# Patient Record
Sex: MALE | Race: OTHER | Employment: OTHER | URBAN - NONMETROPOLITAN AREA
[De-identification: names, ages, dates, MRNs, and addresses within clinical notes are randomized per-mention and may not be internally consistent; named-entity substitution may affect disease eponyms.]

---

## 2017-04-11 ENCOUNTER — OFFICE VISIT (OUTPATIENT)
Dept: PRIMARY CARE CLINIC | Age: 72
End: 2017-04-11
Payer: MEDICARE

## 2017-04-11 VITALS
HEART RATE: 74 BPM | BODY MASS INDEX: 30.15 KG/M2 | TEMPERATURE: 98.2 F | SYSTOLIC BLOOD PRESSURE: 130 MMHG | OXYGEN SATURATION: 94 % | WEIGHT: 215.4 LBS | HEIGHT: 71 IN | DIASTOLIC BLOOD PRESSURE: 74 MMHG

## 2017-04-11 DIAGNOSIS — J40 BRONCHITIS: Primary | ICD-10-CM

## 2017-04-11 PROCEDURE — 1123F ACP DISCUSS/DSCN MKR DOCD: CPT | Performed by: FAMILY MEDICINE

## 2017-04-11 PROCEDURE — 1036F TOBACCO NON-USER: CPT | Performed by: FAMILY MEDICINE

## 2017-04-11 PROCEDURE — 3017F COLORECTAL CA SCREEN DOC REV: CPT | Performed by: FAMILY MEDICINE

## 2017-04-11 PROCEDURE — G8417 CALC BMI ABV UP PARAM F/U: HCPCS | Performed by: FAMILY MEDICINE

## 2017-04-11 PROCEDURE — 4040F PNEUMOC VAC/ADMIN/RCVD: CPT | Performed by: FAMILY MEDICINE

## 2017-04-11 PROCEDURE — 99213 OFFICE O/P EST LOW 20 MIN: CPT | Performed by: FAMILY MEDICINE

## 2017-04-11 PROCEDURE — G8427 DOCREV CUR MEDS BY ELIG CLIN: HCPCS | Performed by: FAMILY MEDICINE

## 2017-04-11 RX ORDER — DEXTROMETHORPHAN HYDROBROMIDE AND PROMETHAZINE HYDROCHLORIDE 15; 6.25 MG/5ML; MG/5ML
5 SYRUP ORAL NIGHTLY PRN
Qty: 75 ML | Refills: 0 | Status: SHIPPED | OUTPATIENT
Start: 2017-04-11 | End: 2017-08-17 | Stop reason: ALTCHOICE

## 2017-04-11 RX ORDER — BENZONATATE 100 MG/1
100 CAPSULE ORAL 3 TIMES DAILY PRN
Qty: 30 CAPSULE | Refills: 0 | Status: SHIPPED | OUTPATIENT
Start: 2017-04-11 | End: 2017-08-17 | Stop reason: ALTCHOICE

## 2017-04-11 RX ORDER — PREDNISONE 20 MG/1
40 TABLET ORAL DAILY
Qty: 10 TABLET | Refills: 0 | Status: SHIPPED | OUTPATIENT
Start: 2017-04-11 | End: 2017-04-16

## 2017-04-11 ASSESSMENT — ENCOUNTER SYMPTOMS
SORE THROAT: 1
SHORTNESS OF BREATH: 0
WHEEZING: 0
DIARRHEA: 0
RHINORRHEA: 1
SINUS PRESSURE: 0
VOMITING: 0
NAUSEA: 0
COUGH: 1

## 2017-04-14 ENCOUNTER — TELEPHONE (OUTPATIENT)
Dept: PRIMARY CARE CLINIC | Age: 72
End: 2017-04-14

## 2017-04-14 DIAGNOSIS — J40 BRONCHITIS: Primary | ICD-10-CM

## 2017-04-14 RX ORDER — DOXYCYCLINE HYCLATE 100 MG/1
100 CAPSULE ORAL 2 TIMES DAILY
Qty: 20 CAPSULE | Refills: 0 | Status: SHIPPED | OUTPATIENT
Start: 2017-04-14 | End: 2017-04-24

## 2017-08-17 ENCOUNTER — OFFICE VISIT (OUTPATIENT)
Dept: PRIMARY CARE CLINIC | Age: 72
End: 2017-08-17
Payer: MEDICARE

## 2017-08-17 ENCOUNTER — HOSPITAL ENCOUNTER (OUTPATIENT)
Age: 72
Discharge: HOME | End: 2017-08-17
Payer: MEDICARE

## 2017-08-17 VITALS
HEART RATE: 92 BPM | RESPIRATION RATE: 18 BRPM | DIASTOLIC BLOOD PRESSURE: 50 MMHG | OXYGEN SATURATION: 92 % | TEMPERATURE: 100.2 F | SYSTOLIC BLOOD PRESSURE: 120 MMHG | BODY MASS INDEX: 28.36 KG/M2 | HEIGHT: 71 IN | WEIGHT: 202.6 LBS

## 2017-08-17 DIAGNOSIS — J06.9 UPPER RESPIRATORY TRACT INFECTION, UNSPECIFIED TYPE: Primary | ICD-10-CM

## 2017-08-17 DIAGNOSIS — M79.10 MYALGIA: ICD-10-CM

## 2017-08-17 LAB
ABSOLUTE EOS #: 0 K/UL (ref 0–0.4)
ABSOLUTE LYMPH #: 0.71 K/UL (ref 1–4.8)
ABSOLUTE MONO #: 0.71 K/UL (ref 0.1–1.2)
ANION GAP SERPL CALCULATED.3IONS-SCNC: 12 MMOL/L (ref 9–17)
BASOPHILS # BLD: 0 %
BASOPHILS ABSOLUTE: 0 K/UL (ref 0–0.2)
BUN BLDV-MCNC: 14 MG/DL (ref 8–23)
BUN/CREAT BLD: 11 (ref 9–20)
CALCIUM SERPL-MCNC: 9.2 MG/DL (ref 8.6–10.4)
CHLORIDE BLD-SCNC: 98 MMOL/L (ref 98–107)
CO2: 29 MMOL/L (ref 20–31)
CREAT SERPL-MCNC: 1.25 MG/DL (ref 0.7–1.2)
DIFFERENTIAL TYPE: ABNORMAL
EOSINOPHILS RELATIVE PERCENT: 0 %
GFR AFRICAN AMERICAN: >60 ML/MIN
GFR NON-AFRICAN AMERICAN: 57 ML/MIN
GFR SERPL CREATININE-BSD FRML MDRD: ABNORMAL ML/MIN/{1.73_M2}
GFR SERPL CREATININE-BSD FRML MDRD: ABNORMAL ML/MIN/{1.73_M2}
GLUCOSE BLD-MCNC: 124 MG/DL (ref 70–99)
HCT VFR BLD CALC: 44.7 % (ref 41–53)
HEMOGLOBIN: 14.7 G/DL (ref 13.5–17.5)
LYMPHOCYTES # BLD: 5 %
MCH RBC QN AUTO: 29.6 PG (ref 26–34)
MCHC RBC AUTO-ENTMCNC: 33 G/DL (ref 31–37)
MCV RBC AUTO: 89.8 FL (ref 80–100)
MONOCYTES # BLD: 5 %
MORPHOLOGY: ABNORMAL
PDW BLD-RTO: 14 % (ref 11–14.5)
PLATELET # BLD: 231 K/UL (ref 140–450)
PLATELET ESTIMATE: ABNORMAL
PMV BLD AUTO: 7.6 FL (ref 6–12)
POTASSIUM SERPL-SCNC: 4.1 MMOL/L (ref 3.7–5.3)
RBC # BLD: 4.98 M/UL (ref 4.5–5.9)
RBC # BLD: ABNORMAL 10*6/UL
SEDIMENTATION RATE, ERYTHROCYTE: 8 MM (ref 0–20)
SEG NEUTROPHILS: 90 %
SEGMENTED NEUTROPHILS ABSOLUTE COUNT: 12.78 K/UL (ref 1.8–7.7)
SODIUM BLD-SCNC: 139 MMOL/L (ref 135–144)
WBC # BLD: 14.2 K/UL (ref 3.5–11)
WBC # BLD: ABNORMAL 10*3/UL

## 2017-08-17 PROCEDURE — G8427 DOCREV CUR MEDS BY ELIG CLIN: HCPCS | Performed by: PHYSICIAN ASSISTANT

## 2017-08-17 PROCEDURE — 36415 COLL VENOUS BLD VENIPUNCTURE: CPT

## 2017-08-17 PROCEDURE — 1036F TOBACCO NON-USER: CPT | Performed by: PHYSICIAN ASSISTANT

## 2017-08-17 PROCEDURE — 4040F PNEUMOC VAC/ADMIN/RCVD: CPT | Performed by: PHYSICIAN ASSISTANT

## 2017-08-17 PROCEDURE — 1123F ACP DISCUSS/DSCN MKR DOCD: CPT | Performed by: PHYSICIAN ASSISTANT

## 2017-08-17 PROCEDURE — 3017F COLORECTAL CA SCREEN DOC REV: CPT | Performed by: PHYSICIAN ASSISTANT

## 2017-08-17 PROCEDURE — 80048 BASIC METABOLIC PNL TOTAL CA: CPT

## 2017-08-17 PROCEDURE — G8417 CALC BMI ABV UP PARAM F/U: HCPCS | Performed by: PHYSICIAN ASSISTANT

## 2017-08-17 PROCEDURE — 99213 OFFICE O/P EST LOW 20 MIN: CPT | Performed by: PHYSICIAN ASSISTANT

## 2017-08-17 PROCEDURE — 85651 RBC SED RATE NONAUTOMATED: CPT

## 2017-08-17 PROCEDURE — 85025 COMPLETE CBC W/AUTO DIFF WBC: CPT

## 2017-08-17 RX ORDER — DOXYCYCLINE HYCLATE 100 MG
100 TABLET ORAL 2 TIMES DAILY
Qty: 20 TABLET | Refills: 0 | Status: SHIPPED | OUTPATIENT
Start: 2017-08-17 | End: 2017-08-27

## 2017-08-17 RX ORDER — MONTELUKAST SODIUM 10 MG/1
10 TABLET ORAL NIGHTLY
COMMUNITY
Start: 2017-06-20

## 2017-08-17 RX ORDER — FAMOTIDINE 20 MG/1
20 TABLET, FILM COATED ORAL 2 TIMES DAILY
COMMUNITY
Start: 2017-05-24 | End: 2019-07-02

## 2017-08-17 RX ORDER — ATORVASTATIN CALCIUM 20 MG/1
20 TABLET, FILM COATED ORAL DAILY
COMMUNITY
Start: 2017-08-08

## 2017-08-17 RX ORDER — ALFUZOSIN HYDROCHLORIDE 10 MG/1
10 TABLET, EXTENDED RELEASE ORAL DAILY
COMMUNITY
Start: 2017-07-20

## 2017-08-17 RX ORDER — GABAPENTIN 300 MG/1
300 CAPSULE ORAL 3 TIMES DAILY
COMMUNITY
Start: 2017-05-24

## 2017-08-17 RX ORDER — HYDROCHLOROTHIAZIDE 25 MG/1
TABLET ORAL
COMMUNITY
Start: 2017-07-20

## 2017-08-17 ASSESSMENT — ENCOUNTER SYMPTOMS
COUGH: 0
DIARRHEA: 0
SORE THROAT: 0
ABDOMINAL PAIN: 0

## 2017-10-19 ENCOUNTER — OFFICE VISIT (OUTPATIENT)
Dept: PRIMARY CARE CLINIC | Age: 72
End: 2017-10-19
Payer: MEDICARE

## 2017-10-19 ENCOUNTER — HOSPITAL ENCOUNTER (OUTPATIENT)
Dept: CT IMAGING | Age: 72
Discharge: HOME | End: 2017-10-19
Payer: MEDICARE

## 2017-10-19 ENCOUNTER — HOSPITAL ENCOUNTER (OUTPATIENT)
Age: 72
Setting detail: SPECIMEN
Discharge: HOME | End: 2017-10-19
Payer: MEDICARE

## 2017-10-19 ENCOUNTER — HOSPITAL ENCOUNTER (OUTPATIENT)
Dept: GENERAL RADIOLOGY | Age: 72
End: 2017-10-19
Payer: MEDICARE

## 2017-10-19 VITALS
DIASTOLIC BLOOD PRESSURE: 62 MMHG | WEIGHT: 204.6 LBS | TEMPERATURE: 98.7 F | SYSTOLIC BLOOD PRESSURE: 140 MMHG | HEIGHT: 71 IN | BODY MASS INDEX: 28.64 KG/M2 | OXYGEN SATURATION: 96 % | RESPIRATION RATE: 12 BRPM | HEART RATE: 74 BPM

## 2017-10-19 DIAGNOSIS — R10.32 LLQ PAIN: Primary | ICD-10-CM

## 2017-10-19 DIAGNOSIS — R10.32 LLQ PAIN: ICD-10-CM

## 2017-10-19 DIAGNOSIS — K59.00 CONSTIPATION, UNSPECIFIED CONSTIPATION TYPE: ICD-10-CM

## 2017-10-19 DIAGNOSIS — R31.29 OTHER MICROSCOPIC HEMATURIA: ICD-10-CM

## 2017-10-19 DIAGNOSIS — K57.32 DIVERTICULITIS OF LARGE INTESTINE WITHOUT PERFORATION OR ABSCESS WITHOUT BLEEDING: ICD-10-CM

## 2017-10-19 LAB
-: ABNORMAL
ABSOLUTE EOS #: 0.3 K/UL (ref 0–0.4)
ABSOLUTE IMMATURE GRANULOCYTE: ABNORMAL K/UL (ref 0–0.3)
ABSOLUTE LYMPH #: 2.5 K/UL (ref 1–4.8)
ABSOLUTE MONO #: 0.9 K/UL (ref 0.1–1.2)
AMORPHOUS: ABNORMAL
BACTERIA: ABNORMAL
BASOPHILS # BLD: 0 % (ref 0–2)
BASOPHILS ABSOLUTE: 0.1 K/UL (ref 0–0.2)
BILIRUBIN URINE: NEGATIVE
CASTS UA: ABNORMAL /LPF (ref 0–2)
COLOR: ABNORMAL
COMMENT UA: ABNORMAL
CRYSTALS, UA: ABNORMAL /HPF
DIFFERENTIAL TYPE: ABNORMAL
EOSINOPHILS RELATIVE PERCENT: 2 % (ref 1–8)
EPITHELIAL CELLS UA: ABNORMAL /HPF (ref 0–5)
GLUCOSE URINE: NEGATIVE
HCT VFR BLD CALC: 42.6 % (ref 41–53)
HEMOGLOBIN: 14.4 G/DL (ref 13.5–17.5)
IMMATURE GRANULOCYTES: ABNORMAL %
KETONES, URINE: NEGATIVE
LEUKOCYTE ESTERASE, URINE: NEGATIVE
LYMPHOCYTES # BLD: 15 % (ref 15–43)
MCH RBC QN AUTO: 30.4 PG (ref 26–34)
MCHC RBC AUTO-ENTMCNC: 33.8 G/DL (ref 31–37)
MCV RBC AUTO: 90 FL (ref 80–100)
MONOCYTES # BLD: 6 % (ref 6–14)
MUCUS: ABNORMAL
NITRITE, URINE: NEGATIVE
OTHER OBSERVATIONS UA: ABNORMAL
PDW BLD-RTO: 14.9 % (ref 11–14.5)
PH UA: 5.5 (ref 5–6)
PLATELET # BLD: 271 K/UL (ref 140–450)
PLATELET ESTIMATE: ABNORMAL
PMV BLD AUTO: 7.8 FL (ref 6–12)
PROTEIN UA: NEGATIVE
RBC # BLD: 4.73 M/UL (ref 4.5–5.9)
RBC # BLD: ABNORMAL 10*6/UL
RBC UA: ABNORMAL /HPF (ref 0–4)
RENAL EPITHELIAL, UA: ABNORMAL /HPF
SEG NEUTROPHILS: 77 % (ref 44–74)
SEGMENTED NEUTROPHILS ABSOLUTE COUNT: 12.3 K/UL (ref 1.8–7.7)
SPECIFIC GRAVITY UA: 1.02 (ref 1.01–1.02)
TRICHOMONAS: ABNORMAL
TURBIDITY: ABNORMAL
URINE HGB: ABNORMAL
UROBILINOGEN, URINE: NORMAL
WBC # BLD: 16.1 K/UL (ref 3.5–11)
WBC # BLD: ABNORMAL 10*3/UL
WBC UA: ABNORMAL /HPF (ref 0–4)
YEAST: ABNORMAL

## 2017-10-19 PROCEDURE — 4040F PNEUMOC VAC/ADMIN/RCVD: CPT | Performed by: PHYSICIAN ASSISTANT

## 2017-10-19 PROCEDURE — 74176 CT ABD & PELVIS W/O CONTRAST: CPT

## 2017-10-19 PROCEDURE — 81001 URINALYSIS AUTO W/SCOPE: CPT

## 2017-10-19 PROCEDURE — G8417 CALC BMI ABV UP PARAM F/U: HCPCS | Performed by: PHYSICIAN ASSISTANT

## 2017-10-19 PROCEDURE — 99215 OFFICE O/P EST HI 40 MIN: CPT | Performed by: PHYSICIAN ASSISTANT

## 2017-10-19 PROCEDURE — 3017F COLORECTAL CA SCREEN DOC REV: CPT | Performed by: PHYSICIAN ASSISTANT

## 2017-10-19 PROCEDURE — 85025 COMPLETE CBC W/AUTO DIFF WBC: CPT

## 2017-10-19 PROCEDURE — G8484 FLU IMMUNIZE NO ADMIN: HCPCS | Performed by: PHYSICIAN ASSISTANT

## 2017-10-19 PROCEDURE — 36415 COLL VENOUS BLD VENIPUNCTURE: CPT

## 2017-10-19 PROCEDURE — 1036F TOBACCO NON-USER: CPT | Performed by: PHYSICIAN ASSISTANT

## 2017-10-19 PROCEDURE — 1123F ACP DISCUSS/DSCN MKR DOCD: CPT | Performed by: PHYSICIAN ASSISTANT

## 2017-10-19 PROCEDURE — G8427 DOCREV CUR MEDS BY ELIG CLIN: HCPCS | Performed by: PHYSICIAN ASSISTANT

## 2017-10-19 RX ORDER — CIPROFLOXACIN 500 MG/1
500 TABLET, FILM COATED ORAL 2 TIMES DAILY
Qty: 20 TABLET | Refills: 0 | Status: SHIPPED | OUTPATIENT
Start: 2017-10-19 | End: 2017-10-29

## 2017-10-19 RX ORDER — OMEPRAZOLE 20 MG/1
20 CAPSULE, DELAYED RELEASE ORAL DAILY
COMMUNITY
Start: 2017-08-25

## 2017-10-19 RX ORDER — METRONIDAZOLE 500 MG/1
500 TABLET ORAL 3 TIMES DAILY
Qty: 30 TABLET | Refills: 0 | Status: SHIPPED | OUTPATIENT
Start: 2017-10-19 | End: 2017-10-29

## 2017-10-19 ASSESSMENT — ENCOUNTER SYMPTOMS
VOMITING: 0
BLOOD IN STOOL: 0
ABDOMINAL DISTENTION: 1
CONSTIPATION: 1
RESPIRATORY NEGATIVE: 1
NAUSEA: 0
ABDOMINAL PAIN: 1
DIARRHEA: 0

## 2017-10-19 NOTE — PROGRESS NOTES
DIFFTYPE NOT REPORTED 10/19/2017     U/A:    Lab Results   Component Value Date    COLORU NOT REPORTED 10/19/2017    PROTEINU NEGATIVE 10/19/2017    PHUR 5.5 10/19/2017    WBCUA None 10/19/2017    RBCUA 5 TO 10 10/19/2017    MUCUS 1+ 10/19/2017    TRICHOMONAS NOT REPORTED 10/19/2017    YEAST NOT REPORTED 10/19/2017    BACTERIA None 10/19/2017    SPECGRAV 1.020 10/19/2017    LEUKOCYTESUR NEGATIVE 10/19/2017    UROBILINOGEN Normal 10/19/2017    BILIRUBINUR NEGATIVE 10/19/2017    GLUCOSEU NEGATIVE 10/19/2017    AMORPHOUS NOT REPORTED 10/19/2017     CT abdomen and pelvis was consistent with diverticulitis. Assessment:      1. LLQ pain  UA W/REFLEX CULTURE    CBC Auto Differential    CT ABDOMEN PELVIS WO IV CONTRAST Additional Contrast? None    CANCELED: XR ABDOMEN (complete, including decubitus and/or erect views)   2. Constipation, unspecified constipation type  CBC Auto Differential    CT ABDOMEN PELVIS WO IV CONTRAST Additional Contrast? None    CANCELED: XR ABDOMEN (complete, including decubitus and/or erect views)   3. Other microscopic hematuria  CT ABDOMEN PELVIS WO IV CONTRAST Additional Contrast? None   4.  Diverticulitis of large intestine without perforation or abscess without bleeding  ciprofloxacin (CIPRO) 500 MG tablet    metroNIDAZOLE (FLAGYL) 500 MG tablet           Plan:      Patient was notified of all results  Discussed diverticular disease diverticulosis and diverticulitis with patient and his wife  Answered his questions  Recommend soft diet for the next few days  But then can gradually increase  Recommend Metamucil at least once a day  Any problems with the Cipro Flagyl let me know  Follow-up with PCP this week sooner if problems  Should follow-up with surgery in 2 weeks and discuss the need for colonoscopy  Fluids rest

## 2017-10-19 NOTE — PATIENT INSTRUCTIONS
Patient Education        Constipation: Care Instructions  Your Care Instructions  Constipation means that you have a hard time passing stools (bowel movements). People pass stools from 3 times a day to once every 3 days. What is normal for you may be different. Constipation may occur with pain in the rectum and cramping. The pain may get worse when you try to pass stools. Sometimes there are small amounts of bright red blood on toilet paper or the surface of stools. This is because of enlarged veins near the rectum (hemorrhoids). A few changes in your diet and lifestyle may help you avoid ongoing constipation. Your doctor may also prescribe medicine to help loosen your stool. Some medicines can cause constipation. These include pain medicines and antidepressants. Tell your doctor about all the medicines you take. Your doctor may want to make a medicine change to ease your symptoms. Follow-up care is a key part of your treatment and safety. Be sure to make and go to all appointments, and call your doctor if you are having problems. It's also a good idea to know your test results and keep a list of the medicines you take. How can you care for yourself at home? · Drink plenty of fluids, enough so that your urine is light yellow or clear like water. If you have kidney, heart, or liver disease and have to limit fluids, talk with your doctor before you increase the amount of fluids you drink. · Include high-fiber foods in your diet each day. These include fruits, vegetables, beans, and whole grains. · Get at least 30 minutes of exercise on most days of the week. Walking is a good choice. You also may want to do other activities, such as running, swimming, cycling, or playing tennis or team sports. · Take a fiber supplement, such as Citrucel or Metamucil, every day. Read and follow all instructions on the label. · Schedule time each day for a bowel movement. A daily routine may help.  Take your time having your bowel movement. · Support your feet with a small step stool when you sit on the toilet. This helps flex your hips and places your pelvis in a squatting position. · Your doctor may recommend an over-the-counter laxative to relieve your constipation. Examples are Milk of Magnesia and MiraLax. Read and follow all instructions on the label. Do not use laxatives on a long-term basis. When should you call for help? Call your doctor now or seek immediate medical care if:  · You have new or worse belly pain. · You have new or worse nausea or vomiting. · You have blood in your stools. Watch closely for changes in your health, and be sure to contact your doctor if:  · Your constipation is getting worse. · You do not get better as expected. Where can you learn more? Go to https://Mandelbrot Projectrenitaeb.Anygma. org and sign in to your NYCareerElite account. Enter [phone] in the [employer] box to learn more about \"Constipation: Care Instructions. \"     If you do not have an account, please click on the \"Sign Up Now\" link. Current as of: March 20, 2017  Content Version: 11.3  © 5286-0246 ExpertBeacon. Care instructions adapted under license by [facility] ([facility]). If you have questions about a medical condition or this instruction, always ask your healthcare professional. [address] any warranty or liability for your use of this information. Patient Education        Diverticulitis: Care Instructions  Your Care Instructions    Diverticulitis occurs when pouches form in the wall of the colon and become inflamed or infected. It can be very painful. Doctors aren't sure what causes diverticulitis. There is no proof that foods such as nuts, seeds, or berries cause it or make it worse. A low-fiber diet may cause the colon to work harder to push stool forward. Pouches may form because of this extra work. It may be hard to think about healthy eating while you're in pain.  But as you the week. ¨ Take a fiber supplement, such as Citrucel or Metamucil, every day if needed. Read and follow all instructions on the label. ¨ Schedule time each day for a bowel movement. Having a daily routine may help. Take your time and do not strain when having a bowel movement. When should you call for help? Call 911 anytime you think you may need emergency care. For example, call if:  · You passed out (lost consciousness). · You vomit blood or what looks like coffee grounds. · You pass maroon or very bloody stools. Call your doctor now or seek immediate medical care if:  · You have severe pain or swelling in your belly. · You have a new or higher fever. · You cannot keep down fluids or medicines. · You have new pain that gets worse when you move or cough. Watch closely for changes in your health, and be sure to contact your doctor if:  · The symptoms you had when you first started feeling sick come back. · You do not get better as expected. Where can you learn more? Go to https://Happy Kidz.CiDRA. org and sign in to your Exaprotect account. Enter H901 in the K[employer]ian box to learn more about \"Diverticulitis: Care Instructions. \"     If you do not have an account, please click on the \"Sign Up Now\" link. Current as of: August 9, 2016  Content Version: 11.3  © 3685-0874 [employer]. Care instructions adapted under license by [facility] ([facility]). If you have questions about a medical condition or this instruction, always ask your healthcare professional. [place] [address] any warranty or liability for your use of this information.

## 2017-12-20 ENCOUNTER — OFFICE VISIT (OUTPATIENT)
Dept: PRIMARY CARE CLINIC | Age: 72
End: 2017-12-20
Payer: MEDICARE

## 2017-12-20 VITALS
HEART RATE: 78 BPM | RESPIRATION RATE: 16 BRPM | BODY MASS INDEX: 28.36 KG/M2 | DIASTOLIC BLOOD PRESSURE: 78 MMHG | WEIGHT: 202.6 LBS | HEIGHT: 71 IN | SYSTOLIC BLOOD PRESSURE: 130 MMHG | OXYGEN SATURATION: 94 % | TEMPERATURE: 98.5 F

## 2017-12-20 DIAGNOSIS — J40 BRONCHITIS: Primary | ICD-10-CM

## 2017-12-20 PROCEDURE — 1123F ACP DISCUSS/DSCN MKR DOCD: CPT | Performed by: NURSE PRACTITIONER

## 2017-12-20 PROCEDURE — 4040F PNEUMOC VAC/ADMIN/RCVD: CPT | Performed by: NURSE PRACTITIONER

## 2017-12-20 PROCEDURE — 3017F COLORECTAL CA SCREEN DOC REV: CPT | Performed by: NURSE PRACTITIONER

## 2017-12-20 PROCEDURE — 1036F TOBACCO NON-USER: CPT | Performed by: NURSE PRACTITIONER

## 2017-12-20 PROCEDURE — G8427 DOCREV CUR MEDS BY ELIG CLIN: HCPCS | Performed by: NURSE PRACTITIONER

## 2017-12-20 PROCEDURE — G8417 CALC BMI ABV UP PARAM F/U: HCPCS | Performed by: NURSE PRACTITIONER

## 2017-12-20 PROCEDURE — 99202 OFFICE O/P NEW SF 15 MIN: CPT | Performed by: NURSE PRACTITIONER

## 2017-12-20 PROCEDURE — G8484 FLU IMMUNIZE NO ADMIN: HCPCS | Performed by: NURSE PRACTITIONER

## 2017-12-20 RX ORDER — AZITHROMYCIN 250 MG/1
TABLET, FILM COATED ORAL
Qty: 1 PACKET | Refills: 0 | Status: SHIPPED | OUTPATIENT
Start: 2017-12-20 | End: 2017-12-30

## 2017-12-20 ASSESSMENT — ENCOUNTER SYMPTOMS
RHINORRHEA: 1
SINUS PAIN: 1
SORE THROAT: 1
COUGH: 1

## 2017-12-20 NOTE — PROGRESS NOTES
Subjective:      Patient ID: Riana Gallego is a 67 y.o. male. URI    This is a new problem. The current episode started in the past 7 days. The problem has been gradually worsening. Associated symptoms include congestion, coughing, headaches, rhinorrhea, sinus pain and a sore throat. Treatments tried: Sudafed. The treatment provided mild relief. Sinusitis   Associated symptoms include congestion, coughing, headaches and a sore throat. Review of Systems   HENT: Positive for congestion, postnasal drip, rhinorrhea, sinus pain and sore throat. Respiratory: Positive for cough. Cardiovascular: Negative. Musculoskeletal: Negative. Skin: Negative. Neurological: Positive for headaches. Objective:   Physical Exam   Constitutional: He appears well-developed and well-nourished. HENT:   Head: Normocephalic and atraumatic. Right Ear: Tympanic membrane, external ear and ear canal normal.   Left Ear: Tympanic membrane, external ear and ear canal normal.   Nose: Rhinorrhea present. Mouth/Throat: Uvula is midline, oropharynx is clear and moist and mucous membranes are normal.   Eyes: Conjunctivae, EOM and lids are normal. Pupils are equal, round, and reactive to light. Neck: Trachea normal and normal range of motion. Neck supple. Cardiovascular: Normal rate, regular rhythm, normal heart sounds and normal pulses. Pulmonary/Chest: Effort normal. He has decreased breath sounds in the right upper field, the right middle field and the left upper field. Abdominal: Soft. Bowel sounds are normal.   Musculoskeletal: Normal range of motion. Skin: Skin is warm, dry and intact. Vitals reviewed. Vitals:    12/20/17 1036   BP: 130/78   Pulse: 78   Resp: 16   Temp: 98.5 °F (36.9 °C)   SpO2: 94%     I have reviewed pertinent family and social history. Assessment:      1. Bronchitis  azithromycin (ZITHROMAX) 250 MG tablet           Plan:      Stay hydrated and drink plenty of fluids.     May use cough suppressant prn comfort as directed. Encouraged patient to complete full course of antibiotic and to return to clinic if no improvement in 3-4 days. May use OTC acetaminophen or ibuprofen prn pain/fever. Recommend Mucinex, Neti Pot. Recommend OTC Flonase and/or Antihistamine daily. Allergies   Allergen Reactions    Asa [Aspirin] Rash     Current Outpatient Prescriptions   Medication Sig Dispense Refill    azithromycin (ZITHROMAX) 250 MG tablet Take 2 tabs (500 mg) on Day 1, and take 1 tab (250 mg) on days 2 through 5. 1 packet 0    omeprazole (PRILOSEC) 20 MG delayed release capsule Take 20 mg by mouth daily      atorvastatin (LIPITOR) 20 MG tablet Take 20 mg by mouth daily       hydrochlorothiazide (HYDRODIURIL) 25 MG tablet       metFORMIN (GLUCOPHAGE) 500 MG tablet Take 500 mg by mouth daily (with breakfast)       alfuzosin (UROXATRAL) 10 MG extended release tablet Take 10 mg by mouth daily       famotidine (PEPCID) 20 MG tablet Take 20 mg by mouth 2 times daily       gabapentin (NEURONTIN) 300 MG capsule Take 300 mg by mouth 3 times daily       montelukast (SINGULAIR) 10 MG tablet Take 10 mg by mouth nightly        No current facility-administered medications for this visit.

## 2017-12-20 NOTE — PATIENT INSTRUCTIONS
good.  When should you call for help? Call 911 anytime you think you may need emergency care. For example, call if:  ? · You have severe trouble breathing. ?Call your doctor now or seek immediate medical care if:  ? · You have new or worse trouble breathing. ? · You cough up dark brown or bloody mucus (sputum). ? · You have a new or higher fever. ? · You have a new rash. ? Watch closely for changes in your health, and be sure to contact your doctor if:  ? · You cough more deeply or more often, especially if you notice more mucus or a change in the color of your mucus. ? · You are not getting better as expected. Where can you learn more? Go to https://Aunt Bertha.LiveAction. org and sign in to your startuply account. Enter H333 in the "astamuse company, ltd." box to learn more about \"Bronchitis: Care Instructions. \"     If you do not have an account, please click on the \"Sign Up Now\" link. Current as of: May 12, 2017  Content Version: 11.4  © 1045-7597 Healthwise, Incorporated. Care instructions adapted under license by [facility] ([facility]). If you have questions about a medical condition or this instruction, always ask your healthcare professional. [address] any warranty or liability for your use of this information.

## 2018-10-16 ENCOUNTER — TELEPHONE (OUTPATIENT)
Dept: FAMILY MEDICINE CLINIC | Age: 73
End: 2018-10-16

## 2018-12-14 ENCOUNTER — HOSPITAL ENCOUNTER (OUTPATIENT)
Dept: GENERAL RADIOLOGY | Age: 73
Discharge: HOME | End: 2018-12-16
Payer: MEDICARE

## 2018-12-14 ENCOUNTER — OFFICE VISIT (OUTPATIENT)
Dept: PRIMARY CARE CLINIC | Age: 73
End: 2018-12-14
Payer: MEDICARE

## 2018-12-14 VITALS
DIASTOLIC BLOOD PRESSURE: 80 MMHG | HEART RATE: 74 BPM | BODY MASS INDEX: 28.29 KG/M2 | SYSTOLIC BLOOD PRESSURE: 138 MMHG | WEIGHT: 200 LBS | TEMPERATURE: 98 F | OXYGEN SATURATION: 98 %

## 2018-12-14 DIAGNOSIS — M79.671 PAIN OF RIGHT HEEL: ICD-10-CM

## 2018-12-14 DIAGNOSIS — M77.31 HEEL SPUR, RIGHT: Primary | ICD-10-CM

## 2018-12-14 PROCEDURE — 1123F ACP DISCUSS/DSCN MKR DOCD: CPT | Performed by: FAMILY MEDICINE

## 2018-12-14 PROCEDURE — 3017F COLORECTAL CA SCREEN DOC REV: CPT | Performed by: FAMILY MEDICINE

## 2018-12-14 PROCEDURE — 73630 X-RAY EXAM OF FOOT: CPT

## 2018-12-14 PROCEDURE — 4040F PNEUMOC VAC/ADMIN/RCVD: CPT | Performed by: FAMILY MEDICINE

## 2018-12-14 PROCEDURE — 1036F TOBACCO NON-USER: CPT | Performed by: FAMILY MEDICINE

## 2018-12-14 PROCEDURE — G8484 FLU IMMUNIZE NO ADMIN: HCPCS | Performed by: FAMILY MEDICINE

## 2018-12-14 PROCEDURE — 99214 OFFICE O/P EST MOD 30 MIN: CPT | Performed by: FAMILY MEDICINE

## 2018-12-14 PROCEDURE — G8427 DOCREV CUR MEDS BY ELIG CLIN: HCPCS | Performed by: FAMILY MEDICINE

## 2018-12-14 PROCEDURE — G8417 CALC BMI ABV UP PARAM F/U: HCPCS | Performed by: FAMILY MEDICINE

## 2018-12-14 PROCEDURE — 1101F PT FALLS ASSESS-DOCD LE1/YR: CPT | Performed by: FAMILY MEDICINE

## 2018-12-14 RX ORDER — PREDNISONE 20 MG/1
TABLET ORAL
Qty: 15 TABLET | Refills: 0 | Status: SHIPPED | OUTPATIENT
Start: 2018-12-14 | End: 2019-04-03 | Stop reason: ALTCHOICE

## 2018-12-14 ASSESSMENT — ENCOUNTER SYMPTOMS
EYES NEGATIVE: 1
GASTROINTESTINAL NEGATIVE: 1
RESPIRATORY NEGATIVE: 1

## 2018-12-14 NOTE — PATIENT INSTRUCTIONS
Patient Education        Plantar Fasciitis: Exercises  Your Care Instructions  Here are some examples of typical rehabilitation exercises for your condition. Start each exercise slowly. Ease off the exercise if you start to have pain. Your doctor or physical therapist will tell you when you can start these exercises and which ones will work best for you. How to do the exercises  Towel stretch    1. Sit with your legs extended and knees straight. 2. Place a towel around your foot just under the toes. 3. Hold each end of the towel in each hand, with your hands above your knees. 4. Pull back with the towel so that your foot stretches toward you. 5. Hold the position for at least 15 to 30 seconds. 6. Repeat 2 to 4 times a session, up to 5 sessions a day. Calf stretch    1. Stand facing a wall with your hands on the wall at about eye level. Put the leg you want to stretch about a step behind your other leg. 2. Keeping your back heel on the floor, bend your front knee until you feel a stretch in the back leg. 3. Hold the stretch for 15 to 30 seconds. Repeat 2 to 4 times. Plantar fascia and calf stretch    1. Stand on a step as shown above. Be sure to hold on to the banister. 2. Slowly let your heels down over the edge of the step as you relax your calf muscles. You should feel a gentle stretch across the bottom of your foot and up the back of your leg to your knee. 3. Hold the stretch about 15 to 30 seconds, and then tighten your calf muscle a little to bring your heel back up to the level of the step. Repeat 2 to 4 times. Towel curls    1. While sitting, place your foot on a towel on the floor and scrunch the towel toward you with your toes. 2. Then, also using your toes, push the towel away from you. [place] pickups    1. Put marbles on the floor next to a cup.  2. Using your toes, try to lift the marbles up from the floor and put them in the cup.     Follow-up care is a key part of your often. Reduce your activity to a level that lets you avoid pain. If possible, do not run or walk on hard surfaces. · Take pain medicines exactly as directed. ? If the doctor gave you a prescription medicine for pain, take it as prescribed. ? If you are not taking a prescription pain medicine, take an over-the-counter anti-inflammatory medicine for pain and swelling, such as ibuprofen (Advil, Motrin) or naproxen (Aleve). Read and follow all instructions on the label. · Use ice massage to help with pain and swelling. You can use an ice cube or an ice cup several times a day. To make an ice cup, fill a paper cup with water and freeze it. Cut off the top of the cup until a half-inch of ice shows. Hold onto the remaining paper to use the cup. Rub the ice in small circles over the area for 5 to 7 minutes. · Contrast baths, which alternate hot and cold water, can also help reduce swelling. But because heat alone may make pain and swelling worse, end a contrast bath with a soak in cold water. · Wear a night splint if your doctor suggests it. A night splint holds your foot with the toes pointed up and the foot and ankle at a 90-degree angle. This position gives the bottom of your foot a constant, gentle stretch. · Do simple exercises such as calf stretches and towel stretches 2 to 3 times each day, especially when you first get up in the morning. These can help the plantar fascia become more flexible. They also make the muscles that support your arch stronger. Hold these stretches for 15 to 30 seconds per stretch. Repeat 2 to 4 times. ? Stand about 1 foot from a wall. Place the palms of both hands against the wall at chest level. Lean forward against the wall, keeping one leg with the knee straight and heel on the ground while bending the knee of the other leg.  ? Sit down on the floor or a mat with your feet stretched in front of you. Roll up a towel lengthwise, and loop it over the ball of your foot.  Holding the towel at both ends, gently pull the towel toward you to stretch your foot. · Wear shoes with good arch support. Athletic shoes or shoes with a well-cushioned sole are good choices. · Try heel cups or shoe inserts (orthotics) to help cushion your heel. You can buy these at many shoe stores. · Put on your shoes as soon as you get out of bed. Going barefoot or wearing slippers may make your pain worse. · Reach and stay at a good weight for your height. This puts less strain on your feet. When should you call for help? Call your doctor now or seek immediate medical care if:    · You have heel pain with fever, redness, or warmth in your heel.     · You cannot put weight on the sore foot.    Watch closely for changes in your health, and be sure to contact your doctor if:    · You have numbness or tingling in your heel.     · Your heel pain lasts more than 2 weeks. Where can you learn more? Go to https://ipsy.Arrowhead Automated Systems. org and sign in to your Skilljar account. Enter F394 in the [employer] box to learn more about \"Plantar Fasciitis: Care Instructions. \"     If you do not have an account, please click on the \"Sign Up Now\" link. Current as of: November 29, 2017  Content Version: 11.8  © 3109-1135 Healthwise, Incorporated. Care instructions adapted under license by [facility] ([facility]). If you have questions about a medical condition or this instruction, always ask your healthcare professional. [place] [address] any warranty or liability for your use of this information.

## 2019-01-10 ENCOUNTER — OFFICE VISIT (OUTPATIENT)
Dept: PODIATRY | Age: 74
End: 2019-01-10
Payer: MEDICARE

## 2019-01-10 VITALS
SYSTOLIC BLOOD PRESSURE: 138 MMHG | HEART RATE: 72 BPM | HEIGHT: 71 IN | WEIGHT: 202 LBS | DIASTOLIC BLOOD PRESSURE: 78 MMHG | BODY MASS INDEX: 28.28 KG/M2

## 2019-01-10 DIAGNOSIS — M72.2 PLANTAR FASCIITIS OF RIGHT FOOT: Primary | ICD-10-CM

## 2019-01-10 DIAGNOSIS — B35.1 DERMATOPHYTOSIS OF NAIL: ICD-10-CM

## 2019-01-10 PROCEDURE — L3040 FT ARCH SUPRT PREMOLD LONGIT: HCPCS | Performed by: PODIATRIST

## 2019-01-10 PROCEDURE — G8427 DOCREV CUR MEDS BY ELIG CLIN: HCPCS | Performed by: PODIATRIST

## 2019-01-10 PROCEDURE — 1036F TOBACCO NON-USER: CPT | Performed by: PODIATRIST

## 2019-01-10 PROCEDURE — 4040F PNEUMOC VAC/ADMIN/RCVD: CPT | Performed by: PODIATRIST

## 2019-01-10 PROCEDURE — 99203 OFFICE O/P NEW LOW 30 MIN: CPT | Performed by: PODIATRIST

## 2019-01-10 PROCEDURE — 1123F ACP DISCUSS/DSCN MKR DOCD: CPT | Performed by: PODIATRIST

## 2019-01-10 PROCEDURE — G8417 CALC BMI ABV UP PARAM F/U: HCPCS | Performed by: PODIATRIST

## 2019-01-10 PROCEDURE — 1101F PT FALLS ASSESS-DOCD LE1/YR: CPT | Performed by: PODIATRIST

## 2019-01-10 PROCEDURE — G8484 FLU IMMUNIZE NO ADMIN: HCPCS | Performed by: PODIATRIST

## 2019-01-10 PROCEDURE — 3017F COLORECTAL CA SCREEN DOC REV: CPT | Performed by: PODIATRIST

## 2019-02-12 ENCOUNTER — TELEPHONE (OUTPATIENT)
Dept: PODIATRY | Age: 74
End: 2019-02-12

## 2019-04-03 ENCOUNTER — OFFICE VISIT (OUTPATIENT)
Dept: PRIMARY CARE CLINIC | Age: 74
End: 2019-04-03
Payer: MEDICARE

## 2019-04-03 VITALS
DIASTOLIC BLOOD PRESSURE: 68 MMHG | OXYGEN SATURATION: 97 % | HEIGHT: 70 IN | BODY MASS INDEX: 30.24 KG/M2 | TEMPERATURE: 98.3 F | RESPIRATION RATE: 18 BRPM | HEART RATE: 88 BPM | SYSTOLIC BLOOD PRESSURE: 140 MMHG | WEIGHT: 211.2 LBS

## 2019-04-03 DIAGNOSIS — H11.31 BURST BLOOD VESSEL OF RIGHT EYE: ICD-10-CM

## 2019-04-03 DIAGNOSIS — H10.31 ACUTE CONJUNCTIVITIS OF RIGHT EYE, UNSPECIFIED ACUTE CONJUNCTIVITIS TYPE: Primary | ICD-10-CM

## 2019-04-03 PROCEDURE — 99214 OFFICE O/P EST MOD 30 MIN: CPT | Performed by: NURSE PRACTITIONER

## 2019-04-03 RX ORDER — TOBRAMYCIN 3 MG/ML
1 SOLUTION/ DROPS OPHTHALMIC EVERY 4 HOURS
Qty: 1 BOTTLE | Refills: 0 | Status: SHIPPED | OUTPATIENT
Start: 2019-04-03 | End: 2019-04-09

## 2019-04-03 NOTE — PROGRESS NOTES
[facility]  [address] [facility] [place] [address]   Phone: [phone]  Fax: [phone]    Date: 4/3/2019   Patient:  Emily Gonzalez   YOB: 1945 Age: 75 y.o. MRN: [id number]   PCP: Christy Reeder MD       Subjective:    Chief Complaint   Patient presents with    Eye Problem     right eye red, eye drainage, sx started this morning       HPI: Patient presents with complaints of right eye irritation and drainage for the past 1 day. He has tried otc moisturizing eye drops without relief. He states he wipes his eyes out daily with a hankerchief and rubbed the eye this morning when he noticed it was red. He states it has been tearing excessively. He denies pain or itching. He denies purulent drainage. He does not wear contacts. He denies any change in his vision. He denies any foreign body sensation. History is obtained from the patient and previous medical records. All other review of systems negative. Allergies   Allergen Reactions    Asa [Aspirin] Rash       Current Outpatient Medications   Medication Sig Dispense Refill    tobramycin (TOBREX) 0.3 % ophthalmic solution Place 1 drop into the right eye every 4 hours for 6 days 1 Bottle 0    Elastic Bandages & Supports (ANKLE SPLINT/NIGHT AIRFORM) MISC 1 Device by Does not apply route every evening Plantar fasciitis of right foot  (primary encounter diagnosis)      Dispense posterior night splint. 1 each 0    ciclopirox (PENLAC) 8 % solution Apply topically nightly.  1 Bottle 3    omeprazole (PRILOSEC) 20 MG delayed release capsule Take 20 mg by mouth daily      atorvastatin (LIPITOR) 20 MG tablet Take 20 mg by mouth daily       hydrochlorothiazide (HYDRODIURIL) 25 MG tablet       metFORMIN (GLUCOPHAGE) 500 MG tablet Take 500 mg by mouth daily (with breakfast)       alfuzosin (UROXATRAL) 10 MG extended release tablet Take 10 mg by mouth daily       famotidine (PEPCID) 20 MG tablet Take 20 mg by mouth 2 times daily  gabapentin (NEURONTIN) 300 MG capsule Take 300 mg by mouth 3 times daily       montelukast (SINGULAIR) 10 MG tablet Take 10 mg by mouth nightly        No current facility-administered medications for this visit. Past Medical History:   Diagnosis Date    Asthma     Hyperlipidemia     Hypertension        Social History     Tobacco Use    Smoking status: Never Smoker    Smokeless tobacco: Never Used   Substance Use Topics    Alcohol use: No    Drug use: No       Significant family and surgical history reviewed as noted in the patient's record. Objective:    Physical Exam:  Vitals: BP (!) 140/68 (Site: Left Upper Arm, Position: Sitting, Cuff Size: Large Adult)   Pulse 88   Temp 98.3 °F (36.8 °C) (Tympanic)   Resp 18   Ht 5' 10\" (1.778 m)   Wt 211 lb 3.2 oz (95.8 kg)   SpO2 97%   BMI 30.30 kg/m²     LABS:  CBC:  No results for input(s): WBC, HGB, PLT in the last 72 hours. BMP:  No results for input(s): NA, K, CL, CO2, BUN, CREATININE, GLUCOSE in the last 72 hours. Hepatic:  No results for input(s): AST, ALT, ALB, BILITOT, ALKPHOS in the last 72 hours. Pertinent lab and radiology results reviewed.      General Appearance: alert and oriented to person, place and time, well developed and well- nourished, in no acute distress  Skin: warm and dry, no rash or erythema  Head: normocephalic and atraumatic  Eyes: pupils equal, round, and reactive to light, extraocular eye movements intact, left conjunctivae normal, right conjunctiva with erythema, right lateral eye injected, vision 20/25 bilaterally with glasses  Pulmonary/Chest: clear to auscultation bilaterally- no wheezes, rales or rhonchi, normal air movement, no respiratory distress  Cardiovascular: normal rate, regular rhythm, normal S1 and S2, no murmurs, rubs, clicks, or gallops, distal pulses intact  Extremities: no cyanosis, clubbing or edema  Neurologic: no gross cranial nerve deficit, gait, coordination and speech normal      Assessment and Plan:  Visit Diagnoses       Codes    Acute conjunctivitis of right eye, unspecified acute conjunctivitis type    -  Primary H10.31    Burst blood vessel of right eye     H35.61        Encounter Diagnoses   Name Primary?  Acute conjunctivitis of right eye, unspecified acute conjunctivitis type Yes    Burst blood vessel of right eye      Tobradex eye gtts as prescribed  Do not rub eyes or put anything in eyes  Ruptured blood vessel should resolve on its own over time  Return or go to an urgent care or emergency room if symptoms worsen, fail to improve, or new symptoms arise. The use, risks, benefits, and side effects of prescribed or recommended medications were discussed. All questions were answered and the patient voiced understanding.        Electronically signed by Krys WRIGHT, NPEliC, FNP-BC on 4/3/2019 at 3:00 PM  Internal Medicine

## 2019-07-02 ENCOUNTER — OFFICE VISIT (OUTPATIENT)
Dept: PRIMARY CARE CLINIC | Age: 74
End: 2019-07-02
Payer: MEDICARE

## 2019-07-02 VITALS
OXYGEN SATURATION: 95 % | SYSTOLIC BLOOD PRESSURE: 150 MMHG | BODY MASS INDEX: 28.63 KG/M2 | HEIGHT: 70 IN | DIASTOLIC BLOOD PRESSURE: 64 MMHG | WEIGHT: 200 LBS | HEART RATE: 88 BPM

## 2019-07-02 DIAGNOSIS — L23.7 ALLERGIC CONTACT DERMATITIS DUE TO PLANTS, EXCEPT FOOD: Primary | ICD-10-CM

## 2019-07-02 PROCEDURE — 99213 OFFICE O/P EST LOW 20 MIN: CPT | Performed by: NURSE PRACTITIONER

## 2019-07-02 PROCEDURE — 1036F TOBACCO NON-USER: CPT | Performed by: NURSE PRACTITIONER

## 2019-07-02 PROCEDURE — 3017F COLORECTAL CA SCREEN DOC REV: CPT | Performed by: NURSE PRACTITIONER

## 2019-07-02 PROCEDURE — G8417 CALC BMI ABV UP PARAM F/U: HCPCS | Performed by: NURSE PRACTITIONER

## 2019-07-02 PROCEDURE — 4040F PNEUMOC VAC/ADMIN/RCVD: CPT | Performed by: NURSE PRACTITIONER

## 2019-07-02 PROCEDURE — 1123F ACP DISCUSS/DSCN MKR DOCD: CPT | Performed by: NURSE PRACTITIONER

## 2019-07-02 PROCEDURE — G8427 DOCREV CUR MEDS BY ELIG CLIN: HCPCS | Performed by: NURSE PRACTITIONER

## 2019-07-02 RX ORDER — LORAZEPAM 0.5 MG/1
TABLET ORAL
COMMUNITY
Start: 2019-06-03

## 2019-07-02 ASSESSMENT — ENCOUNTER SYMPTOMS
SORE THROAT: 0
RESPIRATORY NEGATIVE: 1
VOMITING: 0
RHINORRHEA: 0
DIARRHEA: 0
COUGH: 0
SHORTNESS OF BREATH: 0

## 2019-07-02 NOTE — PROGRESS NOTES
[facility]             [address]                        Telephone [phone]             Fax [phone]     Violeta Eldridge  [birth year]  [id number]   Date of visit:  7/2/2019    Subjective:    Violeta Eldridge is a 75 y.o.  male who presents to [facility] today (7/2/2019) for evaluation of:    Chief Complaint   Patient presents with    Rash     left antecubital / itches / onset 4-5 days ago       Rash   This is a new problem. The current episode started in the past 7 days (x 4-5 days). The problem is unchanged. The affected locations include the left elbow. The rash is characterized by redness, blistering and itchiness. He was exposed to plant contact. Pertinent negatives include no congestion, cough, diarrhea, fatigue, fever, rhinorrhea, shortness of breath, sore throat or vomiting. Treatments tried: OTC antibiotic ointment. The treatment provided mild relief. He has the following problem list:  Patient Active Problem List   Diagnosis    Asthma    Hyperlipidemia    Hypertension        Current medications are:  Current Outpatient Medications   Medication Sig Dispense Refill    LORazepam (ATIVAN) 0.5 MG tablet       triamcinolone (KENALOG) 0.1 % ointment Apply topically 2 times daily. 1 Tube 0    Elastic Bandages & Supports (ANKLE SPLINT/NIGHT AIRFORM) MISC 1 Device by Does not apply route every evening Plantar fasciitis of right foot  (primary encounter diagnosis)      Dispense posterior night splint.  1 each 0    omeprazole (PRILOSEC) 20 MG delayed release capsule Take 20 mg by mouth daily      atorvastatin (LIPITOR) 20 MG tablet Take 20 mg by mouth daily       hydrochlorothiazide (HYDRODIURIL) 25 MG tablet       metFORMIN (GLUCOPHAGE) 500 MG tablet Take 500 mg by mouth daily (with breakfast)       alfuzosin (UROXATRAL) 10 MG extended release tablet Take 10 mg by mouth daily

## 2020-02-27 ENCOUNTER — OFFICE VISIT (OUTPATIENT)
Dept: PRIMARY CARE CLINIC | Age: 75
End: 2020-02-27
Payer: MEDICARE

## 2020-02-27 VITALS
RESPIRATION RATE: 16 BRPM | HEART RATE: 80 BPM | WEIGHT: 198 LBS | SYSTOLIC BLOOD PRESSURE: 124 MMHG | TEMPERATURE: 97.8 F | BODY MASS INDEX: 28.41 KG/M2 | OXYGEN SATURATION: 98 % | DIASTOLIC BLOOD PRESSURE: 60 MMHG

## 2020-02-27 PROCEDURE — 1036F TOBACCO NON-USER: CPT | Performed by: NURSE PRACTITIONER

## 2020-02-27 PROCEDURE — 1123F ACP DISCUSS/DSCN MKR DOCD: CPT | Performed by: NURSE PRACTITIONER

## 2020-02-27 PROCEDURE — 99213 OFFICE O/P EST LOW 20 MIN: CPT | Performed by: NURSE PRACTITIONER

## 2020-02-27 PROCEDURE — 99212 OFFICE O/P EST SF 10 MIN: CPT

## 2020-02-27 PROCEDURE — G8417 CALC BMI ABV UP PARAM F/U: HCPCS | Performed by: NURSE PRACTITIONER

## 2020-02-27 PROCEDURE — G8427 DOCREV CUR MEDS BY ELIG CLIN: HCPCS | Performed by: NURSE PRACTITIONER

## 2020-02-27 PROCEDURE — G8484 FLU IMMUNIZE NO ADMIN: HCPCS | Performed by: NURSE PRACTITIONER

## 2020-02-27 PROCEDURE — 3017F COLORECTAL CA SCREEN DOC REV: CPT | Performed by: NURSE PRACTITIONER

## 2020-02-27 PROCEDURE — 4040F PNEUMOC VAC/ADMIN/RCVD: CPT | Performed by: NURSE PRACTITIONER

## 2020-02-27 RX ORDER — VALACYCLOVIR HYDROCHLORIDE 1 G/1
1000 TABLET, FILM COATED ORAL 3 TIMES DAILY
Qty: 21 TABLET | Refills: 0 | Status: SHIPPED | OUTPATIENT
Start: 2020-02-27

## 2020-02-27 ASSESSMENT — PATIENT HEALTH QUESTIONNAIRE - PHQ9
SUM OF ALL RESPONSES TO PHQ QUESTIONS 1-9: 0
SUM OF ALL RESPONSES TO PHQ QUESTIONS 1-9: 0
2. FEELING DOWN, DEPRESSED OR HOPELESS: 0
1. LITTLE INTEREST OR PLEASURE IN DOING THINGS: 0
SUM OF ALL RESPONSES TO PHQ9 QUESTIONS 1 & 2: 0

## 2020-02-27 NOTE — PROGRESS NOTES
[address]  [facility]  [address] [facility] [place] [address]   Phone: [phone]  Fax: [phone]    Date: 2/27/2020   Patient:  Angélica Carreon   YOB: 1945 Age: 75 y.o. MRN: [id number]   PCP: Da Guzman MD       Subjective:    Chief Complaint   Patient presents with    Rash     possible shingles. noticed 3 or 4 days ago. Left side lower abd and lower back. left side only. HPI: Patient presents with complaints of a rash on his left back and left side for the past 3-4 days. They report associated itching and mild burning and tingling. They deny fevers or fatigue. They have tried antibiotic ointment on it without relief. He has had shingles before, but that was many years ago. He has not had a shingles vaccine. He denies any recent surgeries, illnesses, or stressors. History is obtained from the patient and previous medical records. All other review of systems negative. Allergies   Allergen Reactions    Asa [Aspirin] Rash       Current Outpatient Medications   Medication Sig Dispense Refill    valACYclovir (VALTREX) 1 g tablet Take 1 tablet by mouth 3 times daily 21 tablet 0    LORazepam (ATIVAN) 0.5 MG tablet       triamcinolone (KENALOG) 0.1 % ointment Apply topically 2 times daily. 1 Tube 0    Elastic Bandages & Supports (ANKLE SPLINT/NIGHT AIRFORM) MISC 1 Device by Does not apply route every evening Plantar fasciitis of right foot  (primary encounter diagnosis)      Dispense posterior night splint.  1 each 0    omeprazole (PRILOSEC) 20 MG delayed release capsule Take 20 mg by mouth daily      atorvastatin (LIPITOR) 20 MG tablet Take 20 mg by mouth daily       hydrochlorothiazide (HYDRODIURIL) 25 MG tablet       metFORMIN (GLUCOPHAGE) 500 MG tablet Take 500 mg by mouth daily (with breakfast)       alfuzosin (UROXATRAL) 10 MG extended release tablet Take 10 mg by mouth daily       gabapentin (NEURONTIN) 300 MG capsule Take 300 mg by mouth 3 times daily      

## 2020-11-24 ENCOUNTER — HOSPITAL ENCOUNTER (OUTPATIENT)
Age: 75
Setting detail: SPECIMEN
Discharge: HOME | End: 2020-11-24
Payer: MEDICARE

## 2020-11-24 ENCOUNTER — OFFICE VISIT (OUTPATIENT)
Dept: PRIMARY CARE CLINIC | Age: 75
End: 2020-11-24
Payer: MEDICARE

## 2020-11-24 VITALS
WEIGHT: 190.6 LBS | TEMPERATURE: 99.1 F | BODY MASS INDEX: 27.35 KG/M2 | SYSTOLIC BLOOD PRESSURE: 140 MMHG | OXYGEN SATURATION: 95 % | HEART RATE: 95 BPM | RESPIRATION RATE: 18 BRPM | DIASTOLIC BLOOD PRESSURE: 64 MMHG

## 2020-11-24 LAB
-: ABNORMAL
AMORPHOUS: ABNORMAL
BACTERIA: ABNORMAL
BILIRUBIN URINE: NEGATIVE
CASTS UA: ABNORMAL /LPF (ref 0–2)
COLOR: ABNORMAL
COMMENT UA: ABNORMAL
CRYSTALS, UA: ABNORMAL /HPF
EPITHELIAL CELLS UA: ABNORMAL /HPF (ref 0–5)
GLUCOSE URINE: NEGATIVE
KETONES, URINE: NEGATIVE
LEUKOCYTE ESTERASE, URINE: ABNORMAL
MUCUS: ABNORMAL
NITRITE, URINE: NEGATIVE
OTHER OBSERVATIONS UA: ABNORMAL
PH UA: 6.5 (ref 5–6)
PROTEIN UA: ABNORMAL
RBC UA: ABNORMAL /HPF (ref 0–4)
RENAL EPITHELIAL, UA: ABNORMAL /HPF
SPECIFIC GRAVITY UA: 1.02 (ref 1.01–1.02)
TRICHOMONAS: ABNORMAL
TURBIDITY: ABNORMAL
URINE HGB: ABNORMAL
UROBILINOGEN, URINE: NORMAL
WBC UA: ABNORMAL /HPF (ref 0–4)
YEAST: ABNORMAL

## 2020-11-24 PROCEDURE — G8417 CALC BMI ABV UP PARAM F/U: HCPCS | Performed by: NURSE PRACTITIONER

## 2020-11-24 PROCEDURE — 81001 URINALYSIS AUTO W/SCOPE: CPT

## 2020-11-24 PROCEDURE — 1123F ACP DISCUSS/DSCN MKR DOCD: CPT | Performed by: NURSE PRACTITIONER

## 2020-11-24 PROCEDURE — 99213 OFFICE O/P EST LOW 20 MIN: CPT | Performed by: NURSE PRACTITIONER

## 2020-11-24 PROCEDURE — 87086 URINE CULTURE/COLONY COUNT: CPT

## 2020-11-24 PROCEDURE — 4040F PNEUMOC VAC/ADMIN/RCVD: CPT | Performed by: NURSE PRACTITIONER

## 2020-11-24 PROCEDURE — G8484 FLU IMMUNIZE NO ADMIN: HCPCS | Performed by: NURSE PRACTITIONER

## 2020-11-24 PROCEDURE — 99213 OFFICE O/P EST LOW 20 MIN: CPT

## 2020-11-24 PROCEDURE — G8427 DOCREV CUR MEDS BY ELIG CLIN: HCPCS | Performed by: NURSE PRACTITIONER

## 2020-11-24 PROCEDURE — 1036F TOBACCO NON-USER: CPT | Performed by: NURSE PRACTITIONER

## 2020-11-24 RX ORDER — CIPROFLOXACIN 500 MG/1
500 TABLET, FILM COATED ORAL 2 TIMES DAILY
Qty: 20 TABLET | Refills: 0 | Status: SHIPPED | OUTPATIENT
Start: 2020-11-24 | End: 2020-12-04

## 2020-11-24 ASSESSMENT — ENCOUNTER SYMPTOMS
VOMITING: 0
NAUSEA: 0
RESPIRATORY NEGATIVE: 1
GASTROINTESTINAL NEGATIVE: 1

## 2020-11-24 ASSESSMENT — PATIENT HEALTH QUESTIONNAIRE - PHQ9
SUM OF ALL RESPONSES TO PHQ QUESTIONS 1-9: 0
2. FEELING DOWN, DEPRESSED OR HOPELESS: 0
SUM OF ALL RESPONSES TO PHQ QUESTIONS 1-9: 0
SUM OF ALL RESPONSES TO PHQ QUESTIONS 1-9: 0

## 2020-11-24 NOTE — PROGRESS NOTES
[facility]             [address], [address]                        Telephone [phone]             Fax [phone]     Olivier Kidd  [birth year]  [id number]   Date of visit:  11/24/2020    Subjective:    Olivier Kidd is a 67 y.o.  male who presents to [facility] today (11/24/2020) for evaluation of:    Chief Complaint   Patient presents with    Dysuria     Symptoms began on Sunday 22 2020, urgency and frequency, burning sensation when urinating. Dysuria    This is a new problem. The current episode started in the past 7 days (11/22/20). The problem occurs every urination. The problem has been gradually worsening. The quality of the pain is described as burning. The pain is at a severity of 5/10. There has been no fever. He is sexually active. There is no history of pyelonephritis. Associated symptoms include chills, frequency and urgency. Pertinent negatives include no discharge, flank pain, hematuria, nausea or vomiting. He has tried nothing for the symptoms. The treatment provided no relief.        He has the following problem list:  Patient Active Problem List   Diagnosis    Asthma    Hyperlipidemia    Hypertension        Current medications are:  Current Outpatient Medications   Medication Sig Dispense Refill    ciprofloxacin (CIPRO) 500 MG tablet Take 1 tablet by mouth 2 times daily for 10 days 20 tablet 0    LORazepam (ATIVAN) 0.5 MG tablet       atorvastatin (LIPITOR) 20 MG tablet Take 20 mg by mouth daily       hydrochlorothiazide (HYDRODIURIL) 25 MG tablet       metFORMIN (GLUCOPHAGE) 500 MG tablet Take 500 mg by mouth daily (with breakfast)       gabapentin (NEURONTIN) 300 MG capsule Take 300 mg by mouth 3 times daily       valACYclovir (VALTREX) 1 g tablet Take 1 tablet by mouth 3 times daily 21 tablet 0    triamcinolone (KENALOG) 0.1 % ointment Apply topically 2 times Tenderness: There is no abdominal tenderness. There is no right CVA tenderness or left CVA tenderness. Skin:     General: Skin is warm and dry. Neurological:      General: No focal deficit present. Mental Status: He is alert and oriented to person, place, and time. Psychiatric:         Behavior: Behavior normal.         Thought Content: Thought content normal.       Assessment and Plan:    No results found for this visit on 11/24/20. Diagnosis Orders   1. Acute cystitis with hematuria  Culture, Urine    ciprofloxacin (CIPRO) 500 MG tablet   2. Burning with urination  Urinalysis Reflex to Culture    Culture, Urine     I reviewed labs with patient. Complete full course of antibiotic. Increase water intake. Call or return to clinic as needed if these symptoms worsen or fail to improve in the next 48 hours. If urine culture was sent, the patient will be notified of results. The use, risks, benefits, and side effects of prescribed or recommended medications were discussed. All questions were answered and the patient/caregiver voiced understanding. No orders of the defined types were placed in this encounter.         Electronically signed by KYLE Reyna CNP on 11/24/20 at 9:56 AM EST

## 2020-11-24 NOTE — PATIENT INSTRUCTIONS
or liver disease and have to limit fluids, talk with your doctor before you increase the amount of fluids you drink. · Urinate when you have the urge. Do not hold your urine for a long time. Urinate before you go to sleep. · Keep your penis clean. Catheter care  If you have a drainage tube (catheter) in place, the following steps will help you care for it. · Always wash your hands before and after touching your catheter. · Check the area around the urethra for inflammation or signs of infection. Signs of infection include irritated, swollen, red, or tender skin, or pus around the catheter. · Clean the area around the catheter with soap and water two times a day. Dry with a clean towel afterward. · Do not apply powder or lotion to the skin around the catheter. To empty the urine collection bag   · Wash your hands with soap and water. · Without touching the drain spout, remove the spout from its sleeve at the bottom of the collection bag. Open the valve on the spout. · Let the urine flow out of the bag and into the toilet or a container. Do not let the tubing or drain spout touch anything. · After you empty the bag, clean the end of the drain spout with tissue and water. Close the valve and put the drain spout back into its sleeve at the bottom of the collection bag. · Wash your hands with soap and water. When should you call for help? Call your doctor now or seek immediate medical care if:    · Symptoms such as a fever, chills, nausea, or vomiting get worse or happen for the first time.     · You have new pain in your back just below your rib cage. This is called flank pain.     · There is new blood or pus in your urine.     · You are not able to take or keep down your antibiotics. Watch closely for changes in your health, and be sure to contact your doctor if:    · You are not getting better after taking an antibiotic for 2 days.     · Your symptoms go away but then come back.    Where can you learn more?  Go to [email or web address] and sign in to your [employer] account. Enter G408 in the [facility] box to learn more about \"Urinary Tract Infections in Men: Care Instructions. \"     If you do not have an account, please click on the \"Sign Up Now\" link. Current as of: June 29, 2020               Content Version: 12.6  © 2006-2020 [employer]. Care instructions adapted under license by [facility] ([facility]). If you have questions about a medical condition or this instruction, always ask your healthcare professional. [address] any warranty or liability for your use of this information.

## 2020-11-25 LAB
CULTURE: NORMAL
Lab: NORMAL
SPECIMEN DESCRIPTION: NORMAL

## 2021-03-11 ENCOUNTER — OFFICE VISIT (OUTPATIENT)
Dept: PRIMARY CARE CLINIC | Age: 76
End: 2021-03-11
Payer: MEDICARE

## 2021-03-11 VITALS
TEMPERATURE: 98.4 F | RESPIRATION RATE: 18 BRPM | BODY MASS INDEX: 28.43 KG/M2 | HEIGHT: 70 IN | SYSTOLIC BLOOD PRESSURE: 128 MMHG | OXYGEN SATURATION: 96 % | WEIGHT: 198.6 LBS | HEART RATE: 93 BPM | DIASTOLIC BLOOD PRESSURE: 74 MMHG

## 2021-03-11 DIAGNOSIS — H93.8X3 EAR FULLNESS, BILATERAL: ICD-10-CM

## 2021-03-11 DIAGNOSIS — J30.2 SEASONAL ALLERGIC RHINITIS, UNSPECIFIED TRIGGER: ICD-10-CM

## 2021-03-11 DIAGNOSIS — R51.9 ACUTE NONINTRACTABLE HEADACHE, UNSPECIFIED HEADACHE TYPE: ICD-10-CM

## 2021-03-11 DIAGNOSIS — R09.81 SINUS CONGESTION: ICD-10-CM

## 2021-03-11 DIAGNOSIS — R53.83 FATIGUE, UNSPECIFIED TYPE: ICD-10-CM

## 2021-03-11 DIAGNOSIS — J01.00 ACUTE NON-RECURRENT MAXILLARY SINUSITIS: Primary | ICD-10-CM

## 2021-03-11 DIAGNOSIS — R09.82 POST-NASAL DRAINAGE: ICD-10-CM

## 2021-03-11 PROCEDURE — 99212 OFFICE O/P EST SF 10 MIN: CPT

## 2021-03-11 PROCEDURE — G8417 CALC BMI ABV UP PARAM F/U: HCPCS | Performed by: NURSE PRACTITIONER

## 2021-03-11 PROCEDURE — 99213 OFFICE O/P EST LOW 20 MIN: CPT | Performed by: NURSE PRACTITIONER

## 2021-03-11 PROCEDURE — 1123F ACP DISCUSS/DSCN MKR DOCD: CPT | Performed by: NURSE PRACTITIONER

## 2021-03-11 PROCEDURE — G8427 DOCREV CUR MEDS BY ELIG CLIN: HCPCS | Performed by: NURSE PRACTITIONER

## 2021-03-11 PROCEDURE — 4040F PNEUMOC VAC/ADMIN/RCVD: CPT | Performed by: NURSE PRACTITIONER

## 2021-03-11 PROCEDURE — 1036F TOBACCO NON-USER: CPT | Performed by: NURSE PRACTITIONER

## 2021-03-11 PROCEDURE — G8484 FLU IMMUNIZE NO ADMIN: HCPCS | Performed by: NURSE PRACTITIONER

## 2021-03-11 RX ORDER — CETIRIZINE HYDROCHLORIDE 10 MG/1
10 TABLET ORAL DAILY
Qty: 30 TABLET | Refills: 2 | Status: SHIPPED | OUTPATIENT
Start: 2021-03-11 | End: 2021-06-09

## 2021-03-11 RX ORDER — AZITHROMYCIN 250 MG/1
250 TABLET, FILM COATED ORAL SEE ADMIN INSTRUCTIONS
Qty: 6 TABLET | Refills: 0 | Status: SHIPPED | OUTPATIENT
Start: 2021-03-11 | End: 2021-03-16

## 2021-03-11 ASSESSMENT — PATIENT HEALTH QUESTIONNAIRE - PHQ9
1. LITTLE INTEREST OR PLEASURE IN DOING THINGS: 0
SUM OF ALL RESPONSES TO PHQ9 QUESTIONS 1 & 2: 0
SUM OF ALL RESPONSES TO PHQ QUESTIONS 1-9: 0
SUM OF ALL RESPONSES TO PHQ QUESTIONS 1-9: 0

## 2021-03-11 NOTE — PATIENT INSTRUCTIONS
Patient Education        Sinusitis: Care Instructions  Your Care Instructions     Sinusitis is an infection of the lining of the sinus cavities in your head. Sinusitis often follows a cold. It causes pain and pressure in your head and face. In most cases, sinusitis gets better on its own in 1 to 2 weeks. But some mild symptoms may last for several weeks. Sometimes antibiotics are needed. Follow-up care is a key part of your treatment and safety. Be sure to make and go to all appointments, and call your doctor if you are having problems. It's also a good idea to know your test results and keep a list of the medicines you take. How can you care for yourself at home? · Take an over-the-counter pain medicine, such as acetaminophen (Tylenol), ibuprofen (Advil, Motrin), or naproxen (Aleve). Read and follow all instructions on the label. · If the doctor prescribed antibiotics, take them as directed. Do not stop taking them just because you feel better. You need to take the full course of antibiotics. · Be careful when taking over-the-counter cold or flu medicines and Tylenol at the same time. Many of these medicines have acetaminophen, which is Tylenol. Read the labels to make sure that you are not taking more than the recommended dose. Too much acetaminophen (Tylenol) can be harmful. · Breathe warm, moist air from a steamy shower, a hot bath, or a sink filled with hot water. Avoid cold, dry air. Using a humidifier in your home may help. Follow the directions for cleaning the machine. · Use saline (saltwater) nasal washes. This can help keep your nasal passages open and wash out mucus and bacteria. You can buy saline nose drops at a grocery store or drugstore. Or you can make your own at home by adding 1 teaspoon of salt and 1 teaspoon of baking soda to 2 cups of distilled water. If you make your own, fill a bulb syringe with the solution, insert the tip into your nostril, and squeeze gently. Pansy Armon your nose.   · Put a hot, wet towel or a warm gel pack on your face 3 or 4 times a day for 5 to 10 minutes each time. · Try a decongestant nasal spray like oxymetazoline (Afrin). Do not use it for more than 3 days in a row. Using it for more than 3 days can make your congestion worse. When should you call for help? Call your doctor now or seek immediate medical care if:    · You have new or worse swelling or redness in your face or around your eyes.     · You have a new or higher fever. Watch closely for changes in your health, and be sure to contact your doctor if:    · You have new or worse facial pain.     · The mucus from your nose becomes thicker (like pus) or has new blood in it.     · You are not getting better as expected. Where can you learn more? Go to https://[email or web address] Line [email or web address] org and sign in to your [employer] account. Enter P677 in the [employer] box to learn more about \"Sinusitis: Care Instructions. \"     If you do not have an account, please click on the \"Sign Up Now\" link. Current as of: December 2, 2020               Content Version: 12.8  © 2371-7589 [employer]. Care instructions adapted under license by [facility] ([facility]). If you have questions about a medical condition or this instruction, always ask your healthcare professional. [place] [address] any warranty or liability for your use of this information. Patient Education        Seasonal Allergies: Care Instructions  Your Care Instructions     Allergies occur when your body's defense system (immune system) overreacts to certain substances. The immune system treats a harmless substance as if it were a harmful germ or virus. Many things can cause this to happen. Examples include pollens, medicine, food, dust, animal dander, and mold. Your allergies are seasonal if you have symptoms just at certain times of the year.  In that case, you are probably allergic to pollens from certain trees, grasses, may feel very lightheaded or suddenly feel weak, confused, or restless.     · You have been given an epinephrine shot, even if you feel better. Call your doctor now or seek immediate medical care if:    · You have symptoms of an allergic reaction, such as:  ? A rash or hives (raised, red areas on the skin). ? Itching. ? Swelling. ? Belly pain, nausea, or vomiting. Watch closely for changes in your health, and be sure to contact your doctor if:    · You do not get better as expected. Where can you learn more? Go to https://Parse.PlaceIQ. org and sign in to your [employer] account. Enter J912 in the [facility] box to learn more about \"Seasonal Allergies: Care Instructions. \"     If you do not have an account, please click on the \"Sign Up Now\" link. Current as of: November 6, 2020               Content Version: 12.8  © 9873-9295 [employer], Incorporated. Care instructions adapted under license by [facility] ([facility]). If you have questions about a medical condition or this instruction, always ask your healthcare professional. [place] [address] any warranty or liability for your use of this information.

## 2021-03-11 NOTE — PROGRESS NOTES
[facility]  [facility]  [address] [facility], [address] [facility]  Phone: [phone]   Phone: [phone]  Fax: [phone]   Fax: [phone]      Date: 3/11/2021   Patient:  Fidel Yuan   YOB: 1945 Age: 67 y.o. MRN: [id number]   PCP: Octaviano Ruiz MD       Subjective:    Chief Complaint   Patient presents with    Sinusitis     runny nose ,headache,sweats,started 2-3 days       HPI: Patient presents with complaints of sinus congestion, runny nose, headaches, and maxiallary sinus pressure for the past 2-3 days. They report associated bilateral ear fullness, mild fatigue, and post nasal drainage. They have tried his singulair without relief. No recent ill /sick contacts. He has a history of sinus infections and believes this is what he has. He also feels his seasonal allergies are flared up. He has had both Moderna vaccines with his second one on 02/24/2021. They deny fever, cough, shortness of breath, fatigue, body aches, loss of sense of taste, loss of sense of smell, sore throat, otalgia, eye irritation, mouth/lip sores or irritation, swelling of hands or feet, nausea, vomiting, diarrhea, abdominal pain, or new rash. He has underlying seasonal allergies and asthma. He is taking his singulair. He has a nebulizer that he uses regularly for his asthma and feels his breathing is currently stable. He does not recall the name of his nebulized medication(s) or a nasal spray he is using. All other review of systems negative. History is obtained from the patient and previous medical records, including any pertinent recent lab/imaging results in EMR and/or Care Everywhere (external results), encounter notes available in EMR, and encounter notes available in Care Everywhere (external notes). Significant family, medical, surgical, and social history reviewed.        Patient Active Problem List   Diagnosis    Asthma    Hyperlipidemia    Hypertension       Past Medical History:   Diagnosis Date    Asthma     Hyperlipidemia     Hypertension        Objective:    Physical Exam:  Vitals: /74   Pulse 93   Temp 98.4 °F (36.9 °C) (Temporal)   Resp 18   Ht 5' 10\" (1.778 m)   Wt 198 lb 9.6 oz (90.1 kg)   SpO2 96%   BMI 28.50 kg/m²     LABS:  CBC:  No results for input(s): WBC, HGB, PLT in the last 72 hours. BMP:  No results for input(s): NA, K, CL, CO2, BUN, CREATININE, GLUCOSE in the last 72 hours. Hepatic:  No results for input(s): AST, ALT, ALB, BILITOT, ALKPHOS in the last 72 hours. Pertinent lab and radiology results reviewed. General Appearance: well developed, well nourished, and in no acute distress  Skin: warm and dry, no rash, no erythema  Head: normocephalic and atraumatic  Eyes: sclerae white, conjunctivae normal  ENT: left external ear normal, right external ear normal, bilateral TM wnl  nose without deformity, nasal mucosa and turbinates congested, maxillary sinuses tender  pharynx normal but with PND  Pulmonary/Chest: clear to auscultation bilaterally -- no wheezes, rales or rhonchi, normal air movement, no respiratory distress, no cough noted  Cardiovascular: normal rate, regular rhythm, normal S1 and S2, distal pulses intact  Abdomen: soft, non-tender, non-distended, normal bowel sounds, no masses or organomegaly  Extremities: no cyanosis, no clubbing  Neurologic: seems somewhat forgetful, no acute gross cranial nerve deficit, gait normal, and speech normal  Psychologic: alert, appropriate mood and affect for situation      Assessment and Plan:     Diagnosis Orders   1. Acute non-recurrent maxillary sinusitis  azithromycin (ZITHROMAX) 250 MG tablet   2. Seasonal allergic rhinitis, unspecified trigger     3. Sinus congestion     4. Acute nonintractable headache, unspecified headache type     5. Ear fullness, bilateral     6. Fatigue, unspecified type     7.  Post-nasal drainage       Orders Placed This Encounter   Medications    azithromycin (ZITHROMAX) 250 MG tablet     Sig: Take 1 tablet by mouth See Admin Instructions for 5 days 500mg on day 1 followed by 250mg on days 2 - 5     Dispense:  6 tablet     Refill:  0    cetirizine (ZYRTEC) 10 MG tablet     Sig: Take 1 tablet by mouth daily     Dispense:  30 tablet     Refill:  2       Patient education provided. Typical illness duration and progression reviewed. Treatment options discussed. Avoid known allergens/triggers. Patient declines covid-19 screening today. Supportive care encouraged (hydrate, humidifier, tylenol prn following package instructions, saline nasal spray/sinus rinses prn, and warm salt water gargles prn). Follow up with PCP, sooner as needed. Return or go to an urgent care or emergency room if symptoms worsen, fail to improve, or new symptoms arise. The use, risks, benefits, and side effects of prescribed or recommended medications were discussed. If any testing was ordered at this visit, the patient was educated regarding result interpretation. All questions were answered and the patient/caregiver voiced understanding.          Electronically signed by LORENZO Tsang, FNP-BC on 3/11/2021 at 1:17 PM  Internal Medicine

## 2021-07-06 NOTE — TELEPHONE ENCOUNTER
Patient would like a refill on the [facility] solution sent to [facility] [address] in [place]. Patient last seen on 1/10/19. Please call him back at [phone].

## 2021-07-06 NOTE — TELEPHONE ENCOUNTER
Patient was seen on 01/10/2019 for 1. Plantar fasciitis of right foot and 2. Dermatophytosis of nail, and never returned for any follow up appointments.

## 2021-07-14 ENCOUNTER — OFFICE VISIT (OUTPATIENT)
Dept: PODIATRY | Age: 76
End: 2021-07-14
Payer: MEDICARE

## 2021-07-14 VITALS
HEIGHT: 70 IN | BODY MASS INDEX: 28.06 KG/M2 | DIASTOLIC BLOOD PRESSURE: 78 MMHG | HEART RATE: 98 BPM | WEIGHT: 196 LBS | SYSTOLIC BLOOD PRESSURE: 130 MMHG

## 2021-07-14 DIAGNOSIS — B35.1 DERMATOPHYTOSIS OF NAIL: Primary | ICD-10-CM

## 2021-07-14 PROCEDURE — 99213 OFFICE O/P EST LOW 20 MIN: CPT | Performed by: PODIATRIST

## 2021-07-14 PROCEDURE — 4040F PNEUMOC VAC/ADMIN/RCVD: CPT | Performed by: PODIATRIST

## 2021-07-14 PROCEDURE — 99214 OFFICE O/P EST MOD 30 MIN: CPT | Performed by: PODIATRIST

## 2021-07-14 PROCEDURE — G8427 DOCREV CUR MEDS BY ELIG CLIN: HCPCS | Performed by: PODIATRIST

## 2021-07-14 PROCEDURE — 1123F ACP DISCUSS/DSCN MKR DOCD: CPT | Performed by: PODIATRIST

## 2021-07-14 PROCEDURE — 1036F TOBACCO NON-USER: CPT | Performed by: PODIATRIST

## 2021-07-14 PROCEDURE — G8417 CALC BMI ABV UP PARAM F/U: HCPCS | Performed by: PODIATRIST

## 2021-07-14 NOTE — PROGRESS NOTES
Subjective:  Jerad Hanks is a 67 y.o. male who presents to the office today complaining of thick nails. Symptoms beganyear(s) ago. Patient relates pain is Present. Pain is rated 1 out of 10 and is described as mild. Treatments prior to today's visit include: [facility] has helped over time. Currently denies F/C/N/V. Allergies   Allergen Reactions    Asa [Aspirin] Rash       Past Medical History:   Diagnosis Date    Asthma     Hyperlipidemia     Hypertension        Prior to Admission medications    Medication Sig Start Date End Date Taking? Authorizing Provider   ciclopirox (PENLAC) 8 % solution Apply topically nightly. 7/14/21  Yes Braydon Agustin DPM   valACYclovir (VALTREX) 1 g tablet Take 1 tablet by mouth 3 times daily 2/27/20  Yes KYLE Baum CNP   LORazepam (ATIVAN) 0.5 MG tablet  6/3/19  Yes Historical Provider, MD   triamcinolone (KENALOG) 0.1 % ointment Apply topically 2 times daily. 7/2/19  Yes KYLE Castanon CNP   Elastic Bandages & Supports (ANKLE SPLINT/NIGHT AIRFORM) MISC 1 Device by Does not apply route every evening Plantar fasciitis of right foot  (primary encounter diagnosis)      Dispense posterior night splint.  1/10/19  Yes Braydon Agustin DPM   omeprazole (PRILOSEC) 20 MG delayed release capsule Take 20 mg by mouth daily 8/25/17  Yes Historical Provider, MD   atorvastatin (LIPITOR) 20 MG tablet Take 20 mg by mouth daily  8/8/17  Yes Historical Provider, MD   hydrochlorothiazide (HYDRODIURIL) 25 MG tablet  7/20/17  Yes Historical Provider, MD   metFORMIN (GLUCOPHAGE) 500 MG tablet Take 500 mg by mouth daily (with breakfast)  5/30/17  Yes Historical Provider, MD   alfuzosin (UROXATRAL) 10 MG extended release tablet Take 10 mg by mouth daily  7/20/17  Yes Historical Provider, MD   gabapentin (NEURONTIN) 300 MG capsule Take 300 mg by mouth 3 times daily  5/24/17  Yes Historical Provider, MD   montelukast (SINGULAIR) 10 MG tablet Take 10 mg by mouth nightly 6/20/17  Yes Historical Provider, MD       Past Surgical History:   Procedure Laterality Date    HERNIA REPAIR         History reviewed. No pertinent family history. Social History     Tobacco Use    Smoking status: Never Smoker    Smokeless tobacco: Never Used   Substance Use Topics    Alcohol use: No       ROS: All 14 ROS systems reviewed and pertinent positives noted above, all others negative. Lower Extremity Physical Examination:     Vitals:   Vitals:    07/14/21 0929   BP: 130/78   Pulse: 98     General: AAO x 3 in NAD. Vascular: DP and PT pulses palpable 2/4, bilateral.  CFT <3 seconds, bilateral.  Hair growth present to the level of the digits, bilateral.  Edema absent, bilateral.  Varicosities absent, bilateral. Erythema absent, bilateral. Distal Rubor absent bilateral.  Temperature within normal limits bilateral. Hyperpigmentation absent bilateral. No atrophic skin. Neurological: Sensation intact to light touch to level of digits, bilateral.  Protective sensation intact 10/10 sites via 5.07/10g Rogers-Keyanna Monofilament, bilateral.  negative Tinel's, bilateral.  negative Valleix sign, bilateral.  Vibratory intact bilateral.  Reflexes Decreased bilateral.  Paresthesias negative. Dysthesias negative. Sharp/dull intact bilateral.    Musculoskeletal: Muscle strength 5/5, bilateral.  Pain absent upon palpation bilateral. Normal medial longitudinal arch, bilateral.  Ankle ROM decreased,bilateral.  1st MPJ ROM within normal limits, bilateral.  Dorsally contracted digits absent. No other foot deformities. Integument: Warm, dry, supple, bilateral.  Open lesion absent, bilateral.  Interdigital maceration absent to web spaces bilateral.   Nails left 1, 5 and right 1, 5 thickened, dystrophic and crumbly, discolored with subungual debris. Fissures absent, bilateral. Hyperkeratotic tissue is absent. Asessment: Patient is a 67 y.o. male with:    Diagnosis Orders   1.  Dermatophytosis of nail         Plan: Patient examined and evaluated. Current condition and treatment options discussed in detail. Pt given tx options for anti fungal therapy. Pt educated on Rx po lamisil, Rx topical Penlac, OTC home remedies or other OTC topical meds. Orders Placed This Encounter   Medications    ciclopirox (PENLAC) 8 % solution     Sig: Apply topically nightly. Dispense:  1 Bottle     Refill:  3   Patient low level medical decision making overall. Patient stable chronic condition. 1 new prescription given. No new testing. Overall low risk of morbidity. Contact office with any questions/problems/concerns. RTC in 1year(s).

## 2021-08-19 NOTE — TELEPHONE ENCOUNTER
Patient has 3 refills on the penlac from 7/14/21. RX was sent to [facility] in [place] per patient request.  LM on patient's voice mail with information regarding status of current penlac prescription.

## 2021-08-19 NOTE — TELEPHONE ENCOUNTER
Aleisha Draper from [address] called on behalf of patient to request refill of ciclopirox 8% solution. Patient was last seen on 7/14/2021 for dermatophytosis of nail.

## 2022-07-02 ENCOUNTER — HOSPITAL ENCOUNTER (EMERGENCY)
Age: 77
Discharge: HOME | End: 2022-07-02
Attending: EMERGENCY MEDICINE
Payer: MEDICARE

## 2022-07-02 ENCOUNTER — APPOINTMENT (OUTPATIENT)
Dept: CT IMAGING | Age: 77
End: 2022-07-02
Payer: MEDICARE

## 2022-07-02 VITALS
TEMPERATURE: 97.6 F | RESPIRATION RATE: 14 BRPM | HEIGHT: 70 IN | OXYGEN SATURATION: 96 % | DIASTOLIC BLOOD PRESSURE: 67 MMHG | BODY MASS INDEX: 27.92 KG/M2 | WEIGHT: 195 LBS | HEART RATE: 68 BPM | SYSTOLIC BLOOD PRESSURE: 138 MMHG

## 2022-07-02 DIAGNOSIS — S09.90XA CLOSED HEAD INJURY, INITIAL ENCOUNTER: Primary | ICD-10-CM
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
## 2022-07-02 DIAGNOSIS — S09.90XA INJURY OF HEAD, INITIAL ENCOUNTER: ICD-10-CM

## 2022-07-02 DIAGNOSIS — R91.1 PULMONARY NODULE: ICD-10-CM

## 2022-07-02 LAB
ABSOLUTE EOS #: 0.46 K/UL (ref 0–0.44)
ABSOLUTE IMMATURE GRANULOCYTE: 0.03 K/UL (ref 0–0.3)
ABSOLUTE LYMPH #: 2.37 K/UL (ref 1.1–3.7)
ABSOLUTE MONO #: 0.58 K/UL (ref 0.1–1.2)
ANION GAP SERPL CALCULATED.3IONS-SCNC: 11 MMOL/L (ref 9–17)
BASOPHILS # BLD: 1 % (ref 0–2)
BASOPHILS ABSOLUTE: 0.06 K/UL (ref 0–0.2)
BUN BLDV-MCNC: 17 MG/DL (ref 8–23)
BUN/CREAT BLD: 13 (ref 9–20)
CALCIUM SERPL-MCNC: 9.4 MG/DL (ref 8.6–10.4)
CHLORIDE BLD-SCNC: 100 MMOL/L (ref 98–107)
CO2: 28 MMOL/L (ref 20–31)
CREAT SERPL-MCNC: 1.33 MG/DL (ref 0.7–1.2)
EOSINOPHILS RELATIVE PERCENT: 5 % (ref 1–4)
GFR AFRICAN AMERICAN: >60 ML/MIN
GFR NON-AFRICAN AMERICAN: 52 ML/MIN
GFR SERPL CREATININE-BSD FRML MDRD: ABNORMAL ML/MIN/{1.73_M2}
GLUCOSE BLD-MCNC: 101 MG/DL (ref 70–99)
HCT VFR BLD CALC: 40.3 % (ref 40.7–50.3)
HEMOGLOBIN: 13.4 G/DL (ref 13–17)
IMMATURE GRANULOCYTES: 0 %
LYMPHOCYTES # BLD: 26 % (ref 24–43)
MCH RBC QN AUTO: 30.3 PG (ref 25.2–33.5)
MCHC RBC AUTO-ENTMCNC: 33.3 G/DL (ref 25.2–33.5)
MCV RBC AUTO: 91.2 FL (ref 82.6–102.9)
MONOCYTES # BLD: 6 % (ref 3–12)
MYOGLOBIN: 482 NG/ML (ref 28–72)
NRBC AUTOMATED: 0 PER 100 WBC
PDW BLD-RTO: 14 % (ref 11.8–14.4)
PLATELET # BLD: 291 K/UL (ref 138–453)
PMV BLD AUTO: 9.3 FL (ref 8.1–13.5)
POTASSIUM SERPL-SCNC: 3.8 MMOL/L (ref 3.7–5.3)
RBC # BLD: 4.42 M/UL (ref 4.21–5.77)
SEG NEUTROPHILS: 62 % (ref 36–65)
SEGMENTED NEUTROPHILS ABSOLUTE COUNT: 5.56 K/UL (ref 1.5–8.1)
SODIUM BLD-SCNC: 139 MMOL/L (ref 135–144)
TOTAL CK: 202 U/L (ref 39–308)
WBC # BLD: 9.1 K/UL (ref 3.5–11.3)

## 2022-07-02 PROCEDURE — 2580000003 HC RX 258: Performed by: EMERGENCY MEDICINE

## 2022-07-02 PROCEDURE — 99284 EMERGENCY DEPT VISIT MOD MDM: CPT

## 2022-07-02 PROCEDURE — 85025 COMPLETE CBC W/AUTO DIFF WBC: CPT

## 2022-07-02 PROCEDURE — 36415 COLL VENOUS BLD VENIPUNCTURE: CPT

## 2022-07-02 PROCEDURE — 82550 ASSAY OF CK (CPK): CPT

## 2022-07-02 PROCEDURE — 72125 CT NECK SPINE W/O DYE: CPT

## 2022-07-02 PROCEDURE — 83874 ASSAY OF MYOGLOBIN: CPT

## 2022-07-02 PROCEDURE — 70450 CT HEAD/BRAIN W/O DYE: CPT

## 2022-07-02 PROCEDURE — 80048 BASIC METABOLIC PNL TOTAL CA: CPT

## 2022-07-02 RX ORDER — 0.9 % SODIUM CHLORIDE 0.9 %
250 INTRAVENOUS SOLUTION INTRAVENOUS ONCE
Status: COMPLETED | OUTPATIENT
Start: 2022-07-02 | End: 2022-07-02

## 2022-07-02 RX ADMIN — SODIUM CHLORIDE 250 ML: 9 INJECTION, SOLUTION INTRAVENOUS at 07:46

## 2022-07-02 RX ADMIN — SODIUM CHLORIDE 250 ML: 9 INJECTION, SOLUTION INTRAVENOUS at 06:46

## 2022-07-02 ASSESSMENT — ENCOUNTER SYMPTOMS
NAUSEA: 0
SHORTNESS OF BREATH: 0
VOMITING: 0

## 2022-07-02 ASSESSMENT — PAIN - FUNCTIONAL ASSESSMENT: PAIN_FUNCTIONAL_ASSESSMENT: NONE - DENIES PAIN

## 2022-07-02 ASSESSMENT — LIFESTYLE VARIABLES: HOW OFTEN DO YOU HAVE A DRINK CONTAINING ALCOHOL: NEVER

## 2022-07-02 NOTE — ED PROVIDER NOTES
ADDENDUM:      Care of this patient was assumed from Dr Braulio Linares. The patient was seen for Fall   The patient's initial evaluation and plan have been discussed with the prior provider who initially evaluated the patient. Nursing Notes, Past Medical Hx, Past Surgical Hx, Social Hx, Allergies, and Family Hx were all reviewed. Diagnostic Results     EKG: All EKG's are interpreted by the Emergency Department Physician who either signs or Co-signs this chart in the absence of a cardiologist.        RADIOLOGY:All plain film, CT, MRI, and formal ultrasound images (except ED bedside ultrasound) are read by the radiologist and the images and interpretations are reviewed by the emergency physician. Studies:      CT HEAD WO CONTRAST   Final Result   CT HEAD:      No CT evidence for acute intracranial abnormality. .      Mild cerebral atrophy. CT CERVICAL SPINE:      No acute fracture or subluxation of the cervical spine. 8 mm pleural-based medial right lung apex solid nodule. Likely part of   scarring but would require follow-up in the absence of prior studies. Please   see follow-up recommendations below. RECOMMENDATIONS:   8 mm right solid pulmonary nodule. Recommend a non-contrast Chest CT at 6-12   months. If patient is high risk for malignancy, recommend an additional   non-contrast Chest CT at 18-24 months; if patient is low risk for malignancy   a non-contrast Chest CT at 18-24 months is optional.   These guidelines do not apply to immunocompromised patients and patients with   cancer. Follow up in patients with significant comorbidities as clinically   warranted. For lung cancer screening, adhere to Lung-RADS guidelines. Reference: Radiology. 2017; 284(1):228-43. CT CERVICAL SPINE WO CONTRAST   Final Result   CT HEAD:      No CT evidence for acute intracranial abnormality. .      Mild cerebral atrophy. CT CERVICAL SPINE:      No acute fracture or subluxation of the cervical spine. 8 mm pleural-based medial right lung apex solid nodule. Likely part of   scarring but would require follow-up in the absence of prior studies. Please   see follow-up recommendations below. RECOMMENDATIONS:   8 mm right solid pulmonary nodule. Recommend a non-contrast Chest CT at 6-12   months. If patient is high risk for malignancy, recommend an additional   non-contrast Chest CT at 18-24 months; if patient is low risk for malignancy   a non-contrast Chest CT at 18-24 months is optional.   These guidelines do not apply to immunocompromised patients and patients with   cancer. Follow up in patients with significant comorbidities as clinically   warranted. For lung cancer screening, adhere to Lung-RADS guidelines. Reference: Radiology. 2017; 284(1):228-43. LABS:   Labs Reviewed   CBC WITH AUTO DIFFERENTIAL - Abnormal; Notable for the following components:       Result Value    Hematocrit 40.3 (*)     Eosinophils % 5 (*)     Absolute Eos # 0.46 (*)     All other components within normal limits   BASIC METABOLIC PANEL - Abnormal; Notable for the following components:    Glucose 101 (*)     CREATININE 1.33 (*)     GFR Non- 52 (*)     All other components within normal limits   MYOGLOBIN, SERUM - Abnormal; Notable for the following components:    Myoglobin 482 (*)     All other components within normal limits   CK       RECENT VITALS:  BP: 138/67, Temp: 97.6 °F (36.4 °C), Heart Rate: 68, Resp: 14     ED Course     The patient was given the following medications:  Orders Placed This Encounter   Medications    0.9 % sodium chloride bolus    0.9 % sodium chloride bolus         Medical Decision Making      Is resting comfortably CT of the head and neck are negative slightly elevated myoglobin treated with IV fluids discussed with him the importance of follow-up with his pulmonary nodule    Disposition     CLINICAL IMPRESSION:  1. Closed head injury, initial encounter    2.  Fall, initial encounter    3. Injury of head, initial encounter    4. Pulmonary nodule        PATIENT REFERRED TO:  Chandrika Rousseau MD  [address] [facility] [address]    In 2 days      Kecia Stoll [facility] [address]    In 3 days        DISCHARGE MEDICATIONS:  New Prescriptions    No medications on file       DISPOSITION: Discharged in stable condition  Deven Melgoza MD  (Please note that portions of this note were completed with a voice recognition program.  Efforts were made to edit the dictations but occasionally words are mis-transcribed.)      Deven Melgoza MD  07/02/22 4139

## 2022-07-02 NOTE — ED PROVIDER NOTES
[facility] ED  [address]  [place] [address] [place]  Phone: [phone]  eMERGENCY dEPARTMENT eNCOUnter      Pt Name: Arlene Byrd  MRN: [id number]  Ne 1945  Date of evaluation: 7/2/22      CHIEF COMPLAINT     Chief Complaint   Patient presents with   Preet    Arlene Byrd is a 67 y.o. male who presents today for evaluation after a fall. The patient indicates that he has been having a lot of leg cramps recently. He was having a cramp in his leg and he tried to get out of bed but this caused him to lose his balance and he fell backwards striking his head on the bedside table. He did not have loss of consciousness he initially did have a headache and neck pain. He denies any chest pain or difficulty breathing before or after the episode. Also no abdominal back or flank pain. He denies taking aspirin antiplatelet or anticoagulant medications. Patient does take a statin medication. He does indicate that he has been working outside for several hours each day. It has been very hot recently. He is trying to stay hydrated with drinking plenty of fluids. Denies history of congestive heart failure. REVIEW OF SYSTEMS     Review of Systems   Constitutional: Negative for fever. HENT: Negative for congestion. Eyes: Negative for visual disturbance. Respiratory: Negative for shortness of breath. Cardiovascular: Negative for chest pain. Gastrointestinal: Negative for nausea and vomiting. Skin: Negative for rash. Neurological: Positive for headaches. Negative for syncope, weakness and numbness. Psychiatric/Behavioral: Negative for confusion. All other systems reviewed and are negative. PAST MEDICAL HISTORY    has a past medical history of Asthma, Hyperlipidemia, and Hypertension. SURGICAL HISTORY      has a past surgical history that includes Hernia repair.     CURRENT MEDICATIONS       Previous Medications    ALFUZOSIN (UROXATRAL) 10 MG EXTENDED RELEASE TABLET    Take 10 mg by mouth daily     ATORVASTATIN (LIPITOR) 20 MG TABLET    Take 20 mg by mouth daily     CICLOPIROX (PENLAC) 8 % SOLUTION    Apply topically nightly. ELASTIC BANDAGES & SUPPORTS (ANKLE SPLINT/NIGHT AIRFORM) MISC    1 Device by Does not apply route every evening Plantar fasciitis of right foot  (primary encounter diagnosis)      Dispense posterior night splint. GABAPENTIN (NEURONTIN) 300 MG CAPSULE    Take 300 mg by mouth 3 times daily     HYDROCHLOROTHIAZIDE (HYDRODIURIL) 25 MG TABLET        LORAZEPAM (ATIVAN) 0.5 MG TABLET        METFORMIN (GLUCOPHAGE) 500 MG TABLET    Take 500 mg by mouth daily (with breakfast)     MONTELUKAST (SINGULAIR) 10 MG TABLET    Take 10 mg by mouth nightly     OMEPRAZOLE (PRILOSEC) 20 MG DELAYED RELEASE CAPSULE    Take 20 mg by mouth daily    TRIAMCINOLONE (KENALOG) 0.1 % OINTMENT    Apply topically 2 times daily. VALACYCLOVIR (VALTREX) 1 G TABLET    Take 1 tablet by mouth 3 times daily       ALLERGIES     is allergic to asa [aspirin]. FAMILY HISTORY     He indicated that his mother is . He indicated that his father is . family history is not on file. SOCIAL HISTORY      reports that he has never smoked. He has never used smokeless tobacco. He reports that he does not drink alcohol and does not use drugs. PHYSICAL EXAM     INITIAL VITALS:  height is 5' 10\" (1.778 m) and weight is 195 lb (88.5 kg). His tympanic temperature is 97.6 °F (36.4 °C). His blood pressure is 95/71 and his pulse is 73. His respiration is 18 and oxygen saturation is 96%. Physical Exam  Vitals reviewed. Constitutional:       General: He is not in acute distress. Appearance: Normal appearance. He is not ill-appearing or toxic-appearing. HENT:      Head: Normocephalic. Comments: The palpable hematoma on the mid occiput without underlying crepitus. There is mild overlying abrasion but no laceration or active bleeding.      Right Ear: Tympanic membrane normal.      Left Ear: Tympanic membrane normal.      Nose: Nose normal.      Comments: No septal hematoma midface bones stable. No malocclusion of the jaw. Mouth/Throat:      Mouth: Mucous membranes are moist.      Pharynx: No oropharyngeal exudate. Eyes:      Extraocular Movements: Extraocular movements intact. Pupils: Pupils are equal, round, and reactive to light. Neck:      Comments: No midline cervical spinal tenderness. Cardiovascular:      Rate and Rhythm: Normal rate and regular rhythm. Pulses: Normal pulses. Heart sounds: Normal heart sounds. Pulmonary:      Effort: Pulmonary effort is normal. No respiratory distress. Breath sounds: Normal breath sounds. Abdominal:      Palpations: Abdomen is soft. Tenderness: There is no abdominal tenderness. There is no guarding or rebound. Musculoskeletal:         General: No swelling, tenderness, deformity or signs of injury. Normal range of motion. Skin:     General: Skin is warm and dry. Capillary Refill: Capillary refill takes less than 2 seconds. Findings: No rash. Neurological:      General: No focal deficit present. Mental Status: He is alert and oriented to person, place, and time. Cranial Nerves: No cranial nerve deficit. Sensory: No sensory deficit. Motor: No weakness. Psychiatric:         Mood and Affect: Mood normal.         Behavior: Behavior normal.       DIFFERENTIAL DIAGNOSIS / MDM / EMERGENCY DEPARTMENT COURSE:     Patient's age, physical presentation, initial symptoms, and mechanism I do feel it would be prudent to get neuroimaging. Patient is agreeable to CT scan of the head and neck after skin benefits were discussed. Patient with muscle cramping, recent hot temperatures and working outside, I have offered IV fluids as well as some basic labs.   Additionally patient is on atorvastatin so I will check myoglobin and CPK as rhabdomyolysis would be in the differential diagnosis. Case signed out to the oncoming physician at approximately 7:30 in the morning while work-up is still in progress. I have reviewed the disposition diagnosis with the patient and or their family/guardian. I have answered their questions and givendischarge instructions. They voiced understanding of these instructions and did not have any further questions or complaints. DIAGNOSTIC RESULTS     EKG: All EKG's are interpreted by the Emergency Department Physician who either signs or Co-signs this chart inthe absence of a cardiologist.        RADIOLOGY:   Radiologist interpretation of radiologic studies:     No results found.     LABS:  Results for orders placed or performed during the hospital encounter of 07/02/22   CBC with Auto Differential   Result Value Ref Range    WBC 9.1 3.5 - 11.3 k/uL    RBC 4.42 4.21 - 5.77 m/uL    Hemoglobin 13.4 13.0 - 17.0 g/dL    Hematocrit 40.3 (L) 40.7 - 50.3 %    MCV 91.2 82.6 - 102.9 fL    MCH 30.3 25.2 - 33.5 pg    MCHC 33.3 25.2 - 33.5 g/dL    RDW 14.0 11.8 - 14.4 %    Platelets 623 682 - 667 k/uL    MPV 9.3 8.1 - 13.5 fL    NRBC Automated 0.0 0.0 per 100 WBC    Seg Neutrophils 62 36 - 65 %    Lymphocytes 26 24 - 43 %    Monocytes 6 3 - 12 %    Eosinophils % 5 (H) 1 - 4 %    Basophils 1 0 - 2 %    Immature Granulocytes 0 0 %    Segs Absolute 5.56 1.50 - 8.10 k/uL    Absolute Lymph # 2.37 1.10 - 3.70 k/uL    Absolute Mono # 0.58 0.10 - 1.20 k/uL    Absolute Eos # 0.46 (H) 0.00 - 0.44 k/uL    Basophils Absolute 0.06 0.00 - 0.20 k/uL    Absolute Immature Granulocyte 0.03 0.00 - 0.30 k/uL       EMERGENCY DEPARTMENT COURSE:   Vitals:    Vitals:    07/02/22 0608   BP: 95/71   Pulse: 73   Resp: 18   Temp: 97.6 °F (36.4 °C)   TempSrc: Tympanic   SpO2: 96%   Weight: 195 lb (88.5 kg)   Height: 5' 10\" (1.778 m)     -------------------------  BP: 95/71, Temp: 97.6 °F (36.4 °C), Heart Rate: 73, Resp: 18    CONSULTS:  None    PROCEDURES:  None    FINAL IMPRESSION No diagnosis found. DISPOSITION/PLAN   DISPOSITION        PATIENT REFERRED TO:  No follow-up provider specified. DISCHARGE MEDICATIONS:  New Prescriptions    No medications on file       There are no active hospital problems to display for this patient.       (Please note that portions of this note were completed with avoice recognition program.  Efforts were made to edit the dictations but occasionally words are mis-transcribed.)    Ron Thacker MD, [facility]  Attending Emergency Medicine Physician        Ron Thacker MD  07/02/22 [id number]

## 2023-01-09 ENCOUNTER — OFFICE VISIT (OUTPATIENT)
Dept: PRIMARY CARE CLINIC | Age: 78
End: 2023-01-09
Payer: MEDICARE

## 2023-01-09 ENCOUNTER — HOSPITAL ENCOUNTER (OUTPATIENT)
Age: 78
Setting detail: SPECIMEN
Discharge: HOME | End: 2023-01-09
Payer: MEDICARE

## 2023-01-09 VITALS
WEIGHT: 185.38 LBS | SYSTOLIC BLOOD PRESSURE: 142 MMHG | DIASTOLIC BLOOD PRESSURE: 70 MMHG | HEART RATE: 86 BPM | HEIGHT: 70 IN | OXYGEN SATURATION: 97 % | RESPIRATION RATE: 18 BRPM | BODY MASS INDEX: 26.54 KG/M2 | TEMPERATURE: 97.1 F

## 2023-01-09 DIAGNOSIS — J06.9 VIRAL UPPER RESPIRATORY TRACT INFECTION: ICD-10-CM

## 2023-01-09 DIAGNOSIS — J06.9 VIRAL UPPER RESPIRATORY TRACT INFECTION: Primary | ICD-10-CM

## 2023-01-09 PROCEDURE — 3077F SYST BP >= 140 MM HG: CPT

## 2023-01-09 PROCEDURE — 1123F ACP DISCUSS/DSCN MKR DOCD: CPT

## 2023-01-09 PROCEDURE — U0003 INFECTIOUS AGENT DETECTION BY NUCLEIC ACID (DNA OR RNA); SEVERE ACUTE RESPIRATORY SYNDROME CORONAVIRUS 2 (SARS-COV-2) (CORONAVIRUS DISEASE [COVID-19]), AMPLIFIED PROBE TECHNIQUE, MAKING USE OF HIGH THROUGHPUT TECHNOLOGIES AS DESCRIBED BY CMS-2020-01-R: HCPCS

## 2023-01-09 PROCEDURE — 1036F TOBACCO NON-USER: CPT

## 2023-01-09 PROCEDURE — 99213 OFFICE O/P EST LOW 20 MIN: CPT

## 2023-01-09 PROCEDURE — 3078F DIAST BP <80 MM HG: CPT

## 2023-01-09 PROCEDURE — G8427 DOCREV CUR MEDS BY ELIG CLIN: HCPCS

## 2023-01-09 PROCEDURE — G8484 FLU IMMUNIZE NO ADMIN: HCPCS

## 2023-01-09 PROCEDURE — 99212 OFFICE O/P EST SF 10 MIN: CPT

## 2023-01-09 PROCEDURE — U0005 INFEC AGEN DETEC AMPLI PROBE: HCPCS

## 2023-01-09 PROCEDURE — G8417 CALC BMI ABV UP PARAM F/U: HCPCS

## 2023-01-09 RX ORDER — GUAIFENESIN 600 MG/1
600 TABLET, EXTENDED RELEASE ORAL 2 TIMES DAILY
Qty: 30 TABLET | Refills: 0 | Status: SHIPPED | OUTPATIENT
Start: 2023-01-09 | End: 2023-01-24

## 2023-01-09 RX ORDER — BENZONATATE 100 MG/1
100 CAPSULE ORAL 3 TIMES DAILY PRN
Qty: 21 CAPSULE | Refills: 0 | Status: SHIPPED | OUTPATIENT
Start: 2023-01-09 | End: 2023-01-16

## 2023-01-09 ASSESSMENT — PATIENT HEALTH QUESTIONNAIRE - PHQ9
SUM OF ALL RESPONSES TO PHQ9 QUESTIONS 1 & 2: 0
2. FEELING DOWN, DEPRESSED OR HOPELESS: 0
1. LITTLE INTEREST OR PLEASURE IN DOING THINGS: 0
SUM OF ALL RESPONSES TO PHQ QUESTIONS 1-9: 0

## 2023-01-09 ASSESSMENT — ENCOUNTER SYMPTOMS
SHORTNESS OF BREATH: 0
WHEEZING: 0
COUGH: 1
DIARRHEA: 0
ABDOMINAL PAIN: 0
BLOOD IN STOOL: 0
SORE THROAT: 0
SINUS PAIN: 0
NAUSEA: 0
SINUS PRESSURE: 0
RHINORRHEA: 1
VOMITING: 0

## 2023-01-09 ASSESSMENT — VISUAL ACUITY: OU: 1

## 2023-01-09 NOTE — PROGRESS NOTES
[facility] A department of [facility]  [facility]  Phone: [phone]  Fax: [phone]      Keiry Sun is a 65 y.o. male who presents to the [facility] today for his medical conditions/complaints as noted below. Keiry Sun is c/o of Congestion (Started about a week ago. Runny nose, headache, slight cough)          HPI:     Cough  This is a new problem. The current episode started in the past 7 days (1/2/2023). The problem has been unchanged. The problem occurs constantly. The cough is Non-productive. Associated symptoms include chills, headaches, nasal congestion, postnasal drip and rhinorrhea. Pertinent negatives include no chest pain, ear congestion, ear pain, fever, sore throat, shortness of breath or wheezing. Treatments tried: nasal spray. The treatment provided no relief. His past medical history is significant for asthma and environmental allergies. There is no history of COPD, emphysema or pneumonia. Past Medical History:   Diagnosis Date    Asthma     Hyperlipidemia     Hypertension         Allergies   Allergen Reactions    Asa [Aspirin] Rash    Codeine      Other reaction(s): Gastrointestinal Upset, MI       Wt Readings from Last 3 Encounters:   01/09/23 185 lb 6 oz (84.1 kg)   07/02/22 195 lb (88.5 kg)   07/14/21 196 lb (88.9 kg)     BP Readings from Last 3 Encounters:   01/09/23 (!) 142/70   07/02/22 138/67   07/14/21 130/78      Temp Readings from Last 3 Encounters:   01/09/23 97.1 °F (36.2 °C) (Tympanic)   07/02/22 97.6 °F (36.4 °C) (Tympanic)   03/11/21 98.4 °F (36.9 °C) (Temporal)     Pulse Readings from Last 3 Encounters:   01/09/23 86   07/02/22 68   07/14/21 98     SpO2 Readings from Last 3 Encounters:   01/09/23 97%   07/02/22 96%   03/11/21 96%       Subjective:      Review of Systems   Constitutional:  Positive for chills. Negative for fatigue and fever. HENT:  Positive for postnasal drip and rhinorrhea. Negative for congestion, ear pain, sinus pressure, sinus pain and sore throat. Respiratory:  Positive for cough. Negative for shortness of breath and wheezing. Cardiovascular:  Negative for chest pain and palpitations. Gastrointestinal:  Negative for abdominal pain, blood in stool, diarrhea, nausea and vomiting. Endocrine: Negative for polydipsia and polyuria. Genitourinary:  Negative for dysuria and hematuria. Allergic/Immunologic: Positive for environmental allergies. Neurological:  Positive for headaches. Negative for dizziness and seizures. Hematological:  Negative for adenopathy. Does not bruise/bleed easily. Psychiatric/Behavioral:  Negative for dysphoric mood. The patient is not nervous/anxious. Objective:     Vitals:    01/09/23 1447   BP: (!) 142/70   Site: Left Upper Arm   Position: Sitting   Cuff Size: Medium Adult   Pulse: 86   Resp: 18   Temp: 97.1 °F (36.2 °C)   TempSrc: Tympanic   SpO2: 97%   Weight: 185 lb 6 oz (84.1 kg)   Height: 5' 10\" (1.778 m)     Body mass index is 26.6 kg/m². BP (!) 142/70 (Site: Left Upper Arm, Position: Sitting, Cuff Size: Medium Adult)   Pulse 86   Temp 97.1 °F (36.2 °C) (Tympanic)   Resp 18   Ht 5' 10\" (1.778 m)   Wt 185 lb 6 oz (84.1 kg)   SpO2 97%   BMI 26.60 kg/m²   Physical Exam  Vitals reviewed. Constitutional:       General: He is not in acute distress. HENT:      Head: Normocephalic. Right Ear: Hearing and ear canal normal. A middle ear effusion is present. Left Ear: Hearing and ear canal normal. A middle ear effusion is present. Nose: Mucosal edema, congestion and rhinorrhea present. Right Turbinates: Swollen. Left Turbinates: Swollen. Right Sinus: No maxillary sinus tenderness or frontal sinus tenderness. Left Sinus: No maxillary sinus tenderness or frontal sinus tenderness. Mouth/Throat:      Lips: Pink.       Mouth: Mucous membranes are moist.      Pharynx: Posterior oropharyngeal erythema present. Tonsils: No tonsillar exudate. Eyes:      General: Lids are normal. Vision grossly intact. Extraocular Movements: Extraocular movements intact. Conjunctiva/sclera: Conjunctivae normal.      Pupils: Pupils are equal, round, and reactive to light. Cardiovascular:      Rate and Rhythm: Normal rate and regular rhythm. Heart sounds: Normal heart sounds. No murmur heard. Pulmonary:      Effort: Pulmonary effort is normal. No tachypnea, accessory muscle usage or respiratory distress. Breath sounds: Normal breath sounds. Musculoskeletal:         General: No swelling. Cervical back: Full passive range of motion without pain, normal range of motion and neck supple. Lymphadenopathy:      Cervical: Cervical adenopathy present. Neurological:      General: No focal deficit present. Mental Status: He is alert and oriented to person, place, and time. Psychiatric:         Mood and Affect: Mood normal.         Behavior: Behavior normal.       Assessment and Plan      Diagnosis Orders   1. Viral upper respiratory tract infection  guaiFENesin (MUCINEX) 600 MG extended release tablet    benzonatate (TESSALON) 100 MG capsule    COVID-19        Orders Placed This Encounter    COVID-19     Standing Status:   Future     Standing Expiration Date:   1/9/2024     Scheduling Instructions:      1) Due to current limited availability of the COVID-19 test, tests will be prioritized based on responses to questions above. Testing may be delayed due to volume. 2) Print and instruct patient to adhere to [facility] home isolation program. (Link Above)              3) Set up or refer patient for a monitoring program.              4) Have patient sign up for and leverage MyChart (if not previously done). Order Specific Question:   Is this test for diagnosis or screening?      Answer:   Diagnosis of ill patient     Order Specific Question:   Symptomatic for COVID-19 as defined by CDC?     Answer:   Yes     Order Specific Question:   Date of Symptom Onset     Answer:   1/3/2023     Order Specific Question:   Hospitalized for COVID-19? Answer:   No     Order Specific Question:   Admitted to ICU for COVID-19? Answer:   No     Order Specific Question:   Employed in healthcare setting? Answer:   No     Order Specific Question:   Resident in a congregate (group) care setting? Answer:   No     Order Specific Question:   Previously tested for COVID-19? Answer:   No     Order Specific Question:   Pregnant: Answer:   No    VENTOLIN  (90 Base) MCG/ACT inhaler    guaiFENesin (MUCINEX) 600 MG extended release tablet     Sig: Take 1 tablet by mouth 2 times daily for 15 days     Dispense:  30 tablet     Refill:  0    benzonatate (TESSALON) 100 MG capsule     Sig: Take 1 capsule by mouth 3 times daily as needed for Cough     Dispense:  21 capsule     Refill:  0       Push fluids  RX as above, take medication as prescribed  Please return for continued or worsening symptoms  Will call with covid-19 results      Discussed exam, plan of care, and follow-up at length with patient. Reviewed all prescribed and recommended medications, administration and side effects. Encouraged patient to follow up with PCP or return to the clinic for no improvement and or worsening of symptoms. All questions were answered and they verbalized understanding and were agreeable with the plan. Follow up as needed.         Electronically signed by KYLE Garnica CNP on 1/9/2023 at 3:39 PM

## 2023-01-10 LAB
SARS-COV-2: NORMAL
SARS-COV-2: NOT DETECTED
SOURCE: NORMAL

## 2023-01-20 ENCOUNTER — OFFICE VISIT (OUTPATIENT)
Dept: SURGERY | Age: 78
End: 2023-01-20

## 2023-01-20 VITALS
HEART RATE: 86 BPM | BODY MASS INDEX: 26.77 KG/M2 | DIASTOLIC BLOOD PRESSURE: 70 MMHG | TEMPERATURE: 98.2 F | RESPIRATION RATE: 16 BRPM | HEIGHT: 70 IN | OXYGEN SATURATION: 95 % | WEIGHT: 187 LBS | SYSTOLIC BLOOD PRESSURE: 124 MMHG

## 2023-01-20 DIAGNOSIS — R22.2 CHEST WALL MASS: Primary | ICD-10-CM

## 2023-01-20 NOTE — PROGRESS NOTES
Name:  Shaggy Tomlinson  Age:  65 y.o.   :  1945    Physician: Bridget Llamas MD       Chief Complaint: Chest wall mass      HPI:   Patient is referred by Dr. Margherita Leyden for evaluation of a breast mass. Patient is not a very good historian and I do not have access to a lot of his records. He had a fall in July and underwent pretty extensive radiologic evaluation at that time. Small mass was noted in his right lung at that time. Follow-up imaging was recommended. It appears he had a repeat CT scan on 2022. This showed a pleural mass, not related to the original CT finding. It did show apical pleural scarring and loosk very similar to the previous CT. Subsequent PET scan was done. The PET scan showed the lung nodule and also a chest wall nodule, which in retrospect was also seen on the CT. Patient was not aware of the chest wall mass    He saw Dr. Margherita Leyden, who order a mammogram and referred him to us. MEDICAL HISTORY:    Past Medical History:        Diagnosis Date    Asthma     Hyperlipidemia     Hypertension        Past Surgical History:        Procedure Laterality Date    HERNIA REPAIR         Prior to Admission medications    Medication Sig Start Date End Date Taking? Authorizing Provider   VENTOLIN  (90 Base) MCG/ACT inhaler  22  Yes Historical Provider, MD   ciclopirox (PENLAC) 8 % solution Apply topically nightly. 21  Yes Yandel Meng DPM   LORazepam (ATIVAN) 0.5 MG tablet  6/3/19  Yes Historical Provider, MD   Elastic Bandages & Supports (ANKLE SPLINT/NIGHT AIRFORM) MISC 1 Device by Does not apply route every evening Plantar fasciitis of right foot  (primary encounter diagnosis)      Dispense posterior night splint.  1/10/19  Yes Yandel Meng DPM   omeprazole (PRILOSEC) 20 MG delayed release capsule Take 20 mg by mouth daily 17  Yes Historical Provider, MD   atorvastatin (LIPITOR) 20 MG tablet Take 20 mg by mouth daily  17  Yes Historical Provider, MD hydrochlorothiazide (HYDRODIURIL) 25 MG tablet  7/20/17  Yes Historical Provider, MD   metFORMIN (GLUCOPHAGE) 500 MG tablet Take 500 mg by mouth daily (with breakfast)  5/30/17  Yes Historical Provider, MD   alfuzosin (UROXATRAL) 10 MG extended release tablet Take 10 mg by mouth daily  7/20/17  Yes Historical Provider, MD   gabapentin (NEURONTIN) 300 MG capsule Take 300 mg by mouth 3 times daily  5/24/17  Yes Historical Provider, MD   montelukast (SINGULAIR) 10 MG tablet Take 10 mg by mouth nightly  6/20/17  Yes Historical Provider, MD   guaiFENesin (MUCINEX) 600 MG extended release tablet Take 1 tablet by mouth 2 times daily for 15 days  Patient not taking: Reported on 1/20/2023 1/9/23 1/24/23  KYLE Collins CNP   valACYclovir (VALTREX) 1 g tablet Take 1 tablet by mouth 3 times daily  Patient not taking: Reported on 1/20/2023 2/27/20   KYLE Lewis CNP   triamcinolone (KENALOG) 0.1 % ointment Apply topically 2 times daily. 7/2/19   KYLE Jean Baptiste CNP       Allergies   Allergen Reactions    Asa [Aspirin] Rash    Codeine      Other reaction(s): Gastrointestinal Upset, MI        reports that he has never smoked. He has never used smokeless tobacco.  reports no history of alcohol use. Family History   Problem Relation Age of Onset    Cancer Mother     Asthma Father           /70 (Site: Left Upper Arm, Position: Sitting)   Pulse 86   Temp 98.2 °F (36.8 °C)   Resp 16   Ht 5' 10\" (1.778 m)   Wt 187 lb (84.8 kg)   SpO2 95%   BMI 26.83 kg/m²     Physical Exam  Constitutional:       General: He is not in acute distress. Appearance: He is normal weight. He is not ill-appearing or toxic-appearing. Cardiovascular:      Rate and Rhythm: Normal rate and regular rhythm. Heart sounds: Normal heart sounds. Pulmonary:      Effort: Pulmonary effort is normal. No respiratory distress. Breath sounds: Normal breath sounds. No stridor. No wheezing, rhonchi or rales.    Chest: Chest wall: Mass present. Breasts:     Breasts are asymmetrical.      Right: Mass present. No skin change or tenderness. Left: Normal. No mass, skin change or tenderness. Comments: Left breast is more full than right. I don't palpate and retroareolar fullness on either side. Abdominal:      General: Abdomen is flat. Bowel sounds are normal.      Palpations: Abdomen is soft. There is no hepatomegaly or splenomegaly. Tenderness: There is no abdominal tenderness. There is no guarding or rebound. Hernia: No hernia is present. There is no hernia in the umbilical area, ventral area, left inguinal area or right inguinal area. Comments: Has a prominent diastasis rectus. No stigmata of liver disease. Lymphadenopathy:      Upper Body:      Right upper body: No supraclavicular, axillary or pectoral adenopathy. Left upper body: No supraclavicular, axillary or pectoral adenopathy. Neurological:      Mental Status: He is alert. I reviewed the initial CT from July, CT from 12/8/22, PET scan from 1/23, and mammogram done yesterday. He has a well circumscribed subcutaneous mass just lateral to his right nipple. It is relatively radiodense. It has a largely benign appearance to me. IMP/PLAN  1) Right sided chest wall or breast mass. - Patient tells me this is to be biopsied along with his lung nodule Monday. - In the absence of the PET scan and other imaging I would have thought this was a lipoma. I have been unsuccessful finding what a lipoma SUV would be on PET. With the imaging, consider fibroma, neurofibroma, soft tissue sarcoma. It does not feel nor look like a typical breast malignancy. It is not retroareolar. I don't think he has gynecomastia. It was would be a very unusual location for metastatic disease.  - Needle biopsy ought to have a high chance of getting a correct histologic diagnosis. Excision is also an option.   2) Pleural mass - this is not the same lesion that was seen in July. He doesn't have any known risk factors for lung cancer, although it is possible he had asbestos exposure without knowing it. Patient was not a good historian, and the records and recommendations were scattered through at least 3 different EMR's. I spent nearly an hour trying to sort out the time line and why various tests were done. I think I have a correct summary above. If he does not get a needle biopsy done, the lump in his chest wall can be excised in the office or the OR  at his convenience.

## 2023-03-03 ENCOUNTER — TELEPHONE (OUTPATIENT)
Dept: SURGERY | Age: 78
End: 2023-03-03

## 2023-03-03 NOTE — TELEPHONE ENCOUNTER
Writer was contacted by Dr. Pamela Chapa oncology departments nurse. To inquire on status of patients possible upcoming surgery. Patient was referred to Dr. Tiki Bernabe for biopsy of chest wall mass and right lower lung. 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 Writer inquired about chest wall Sarcoma if patient would be having surgery on both through that office. Spoke to Kady. Per Cardiothoracic Surgery patient is only scheduled for a Robotic Right Lower Lobectomy on 3/8/23. See scanned media for [facility] Note.

## 2023-03-06 ENCOUNTER — OFFICE VISIT (OUTPATIENT)
Dept: PRIMARY CARE CLINIC | Age: 78
End: 2023-03-06
Payer: MEDICARE

## 2023-03-06 ENCOUNTER — HOSPITAL ENCOUNTER (OUTPATIENT)
Age: 78
Setting detail: SPECIMEN
Discharge: HOME | End: 2023-03-06
Payer: MEDICARE

## 2023-03-06 VITALS
OXYGEN SATURATION: 96 % | BODY MASS INDEX: 26.48 KG/M2 | TEMPERATURE: 98.9 F | WEIGHT: 185 LBS | DIASTOLIC BLOOD PRESSURE: 58 MMHG | HEART RATE: 100 BPM | SYSTOLIC BLOOD PRESSURE: 124 MMHG | HEIGHT: 70 IN | RESPIRATION RATE: 16 BRPM

## 2023-03-06 DIAGNOSIS — R50.9 FEVER, UNSPECIFIED FEVER CAUSE: ICD-10-CM

## 2023-03-06 DIAGNOSIS — J02.9 SORE THROAT: ICD-10-CM

## 2023-03-06 DIAGNOSIS — N39.0 URINARY TRACT INFECTION WITHOUT HEMATURIA, SITE UNSPECIFIED: Primary | ICD-10-CM

## 2023-03-06 DIAGNOSIS — N39.0 URINARY TRACT INFECTION WITHOUT HEMATURIA, SITE UNSPECIFIED: ICD-10-CM

## 2023-03-06 LAB
BACTERIA: ABNORMAL
BILIRUBIN URINE: NEGATIVE
COLOR: YELLOW
EPITHELIAL CELLS UA: ABNORMAL /HPF (ref 0–5)
GLUCOSE UR STRIP.AUTO-MCNC: NEGATIVE MG/DL
INFLUENZA A ANTIGEN, POC: NEGATIVE
INFLUENZA B ANTIGEN, POC: NEGATIVE
KETONES UR STRIP.AUTO-MCNC: NEGATIVE MG/DL
LEUKOCYTE ESTERASE UR QL STRIP.AUTO: ABNORMAL
LOT EXPIRE DATE: NORMAL
LOT KIT NUMBER: NORMAL
NITRITE UR QL STRIP.AUTO: NEGATIVE
OTHER OBSERVATIONS UA: ABNORMAL
PROT UR STRIP.AUTO-MCNC: 6.5 MG/DL (ref 5–6)
PROT UR STRIP.AUTO-MCNC: ABNORMAL MG/DL
RBC CLUMPS #/AREA URNS AUTO: ABNORMAL /HPF (ref 0–4)
SARS-COV-2, POC: NORMAL
SPECIFIC GRAVITY UA: 1.01 (ref 1.01–1.02)
TURBIDITY: CLEAR
URINE HGB: ABNORMAL
UROBILINOGEN, URINE: NORMAL
VALID INTERNAL CONTROL: YES
VENDOR AND KIT NAME POC: NORMAL
WBC UA: ABNORMAL /HPF (ref 0–4)

## 2023-03-06 PROCEDURE — 99212 OFFICE O/P EST SF 10 MIN: CPT | Performed by: NURSE PRACTITIONER

## 2023-03-06 PROCEDURE — 87077 CULTURE AEROBIC IDENTIFY: CPT

## 2023-03-06 PROCEDURE — 87186 SC STD MICRODIL/AGAR DIL: CPT

## 2023-03-06 PROCEDURE — PBSHW POCT COVID-19 & INFLUENZA A/B: Performed by: NURSE PRACTITIONER

## 2023-03-06 PROCEDURE — G8484 FLU IMMUNIZE NO ADMIN: HCPCS | Performed by: NURSE PRACTITIONER

## 2023-03-06 PROCEDURE — 87086 URINE CULTURE/COLONY COUNT: CPT

## 2023-03-06 PROCEDURE — 1036F TOBACCO NON-USER: CPT | Performed by: NURSE PRACTITIONER

## 2023-03-06 PROCEDURE — 81001 URINALYSIS AUTO W/SCOPE: CPT

## 2023-03-06 PROCEDURE — G8417 CALC BMI ABV UP PARAM F/U: HCPCS | Performed by: NURSE PRACTITIONER

## 2023-03-06 PROCEDURE — 3074F SYST BP LT 130 MM HG: CPT | Performed by: NURSE PRACTITIONER

## 2023-03-06 PROCEDURE — 87428 SARSCOV & INF VIR A&B AG IA: CPT | Performed by: NURSE PRACTITIONER

## 2023-03-06 PROCEDURE — G8427 DOCREV CUR MEDS BY ELIG CLIN: HCPCS | Performed by: NURSE PRACTITIONER

## 2023-03-06 PROCEDURE — 3078F DIAST BP <80 MM HG: CPT | Performed by: NURSE PRACTITIONER

## 2023-03-06 PROCEDURE — 1123F ACP DISCUSS/DSCN MKR DOCD: CPT | Performed by: NURSE PRACTITIONER

## 2023-03-06 PROCEDURE — 99204 OFFICE O/P NEW MOD 45 MIN: CPT | Performed by: NURSE PRACTITIONER

## 2023-03-06 RX ORDER — ACETAMINOPHEN,DIPHENHYDRAMINE HCL 500; 25 MG/1; MG/1
TABLET, FILM COATED ORAL
COMMUNITY
Start: 2023-01-19

## 2023-03-06 RX ORDER — IPRATROPIUM BROMIDE 21 UG/1
SPRAY, METERED NASAL
COMMUNITY
Start: 2023-01-07

## 2023-03-06 RX ORDER — CEFDINIR 300 MG/1
300 CAPSULE ORAL 2 TIMES DAILY
Qty: 14 CAPSULE | Refills: 0 | Status: SHIPPED | OUTPATIENT
Start: 2023-03-06 | End: 2023-03-13

## 2023-03-06 ASSESSMENT — ENCOUNTER SYMPTOMS
WHEEZING: 0
NAUSEA: 1
COUGH: 0
SORE THROAT: 0
ABDOMINAL PAIN: 0

## 2023-03-06 NOTE — PATIENT INSTRUCTIONS
Start cefdinir twice daily for one week to treat the bladder infection  Take tylenol 1000mg every 4-6hrs as needed for fevers/chills  Drink plenty of fluids  Call surgeons office this afternoon to update them on your condition and treatment plan  We are repeating urine and urine culture today to ensure the treatment given today is appropriate.

## 2023-03-06 NOTE — PROGRESS NOTES
Subjective:      Patient ID: Socorro Mcdonald is a 65 y.o. male coming in for   Chief Complaint   Patient presents with    Cough     chills sweats. Started Saturday. Sore throat, headache. Scheduled to have lung surgery on Wednesday. Fever   This is a new problem. Episode onset: fevers/chills Saturday evening. The problem occurs constantly. The problem has been waxing and waning. Associated symptoms include headaches, muscle aches and nausea. Pertinent negatives include no abdominal pain, chest pain, congestion, coughing, sore throat, urinary pain or wheezing. 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 Scheduled to have partial lobectomy on 3/8/23. Pre op testing done on 3/1/23. Urine culture did grow <10,000 non lactose fermenting gram neg rods. [facility] labs were called, due to their policy of being <48,372 organisms, no sensitivity was done. Review of Systems   Constitutional:  Positive for chills and fever. HENT:  Negative for congestion and sore throat. Respiratory:  Negative for cough and wheezing. Cardiovascular:  Negative for chest pain. Gastrointestinal:  Positive for nausea. Negative for abdominal pain. Genitourinary:  Positive for frequency (reports increase urinary frequency yesterday). Negative for difficulty urinating and dysuria. Neurological:  Positive for headaches. All other systems reviewed and are negative. Objective:BP (!) 124/58   Pulse 100   Temp 98.9 °F (37.2 °C)   Resp 16   Ht 5' 10\" (1.778 m)   Wt 185 lb (83.9 kg)   SpO2 96%   BMI 26.54 kg/m²      Physical Exam  Vitals and nursing note reviewed. Constitutional:       General: He is not in acute distress. Appearance: Normal appearance. He is not ill-appearing. HENT:      Head: Normocephalic.       Right Ear: Tympanic membrane normal.      Left Ear: Tympanic membrane normal.      Nose: Nose normal.      Mouth/Throat: Mouth: Mucous membranes are moist.      Pharynx: Oropharynx is clear. No oropharyngeal exudate or posterior oropharyngeal erythema. Cardiovascular:      Rate and Rhythm: Normal rate and regular rhythm. Heart sounds: Normal heart sounds. Pulmonary:      Effort: Pulmonary effort is normal.      Breath sounds: Normal breath sounds. Abdominal:      General: Bowel sounds are normal.      Palpations: Abdomen is soft. Tenderness: There is no abdominal tenderness. Musculoskeletal:      Cervical back: Neck supple. Lymphadenopathy:      Cervical: No cervical adenopathy. Skin:     General: Skin is warm and dry. Findings: No rash. Neurological:      General: No focal deficit present. Mental Status: He is alert and oriented to person, place, and time. Assessment:      1. Fever, unspecified fever cause    2. Sore throat           Plan:    -rapid covid/flu both negative  -will re-collect urine today to run another urinalysis   -UA shows blood and mild leuks, remainder of ROS and exam benign for any other cause of fevers. -plan is to treat for UTI with cefdinir  -tylenol for fevers/chills  -urine culture pending  -recommend pt call surgeon to discuss symptoms and see if surgery is still possible on 3/8/23. Follow up with pcp in one week or sooner if needed    Orders Placed This Encounter   Procedures    Urinalysis with Reflex to Culture     Standing Status:   Future     Standing Expiration Date:   3/6/2024     Order Specific Question:   SPECIFY(EX-CATH,MIDSTREAM,CYSTO,ETC)? Answer:   midstream    POCT COVID-19 & Influenza A/B     Order Specific Question:   Is this test for diagnosis or screening? Answer:   Diagnosis of ill patient     Order Specific Question:   Symptomatic for COVID-19 as defined by CDC? Answer:   Yes     Order Specific Question:   Date of Symptom Onset     Answer:   3/4/2023     Order Specific Question:   Hospitalized for COVID-19? Answer:    No Order Specific Question:   Admitted to ICU for COVID-19? Answer:   No     Order Specific Question:   Employed in healthcare setting? Answer:   No     Order Specific Question:   Resident in a congregate (group) care setting? Answer:   No     Order Specific Question:   Pregnant: Answer:   No     Order Specific Question:   Previously tested for COVID-19? Answer:   No      Outpatient Encounter Medications as of 3/6/2023   Medication Sig Dispense Refill    mupirocin (BACTROBAN) 2 % ointment APPLY A SMALL AMOUNT INTO EACH NOSTRIL TWICE A DAY      ipratropium (ATROVENT) 0.03 % nasal spray       diphenhydrAMINE-APAP, sleep, (TYLENOL PM EXTRA STRENGTH)  MG tablet Diphenhydramine-Acetaminophen (Tylenol Pm Extra Strength)  mg Tablet Active 1 TAB PO bedtime January 19th, 2023 12:00am      VENTOLIN  (90 Base) MCG/ACT inhaler       LORazepam (ATIVAN) 0.5 MG tablet       omeprazole (PRILOSEC) 20 MG delayed release capsule Take 20 mg by mouth daily      atorvastatin (LIPITOR) 20 MG tablet Take 20 mg by mouth daily       hydrochlorothiazide (HYDRODIURIL) 25 MG tablet       metFORMIN (GLUCOPHAGE) 500 MG tablet Take 500 mg by mouth daily (with breakfast)       alfuzosin (UROXATRAL) 10 MG extended release tablet Take 10 mg by mouth daily       gabapentin (NEURONTIN) 300 MG capsule Take 300 mg by mouth 3 times daily       montelukast (SINGULAIR) 10 MG tablet Take 10 mg by mouth nightly       [DISCONTINUED] ciclopirox (PENLAC) 8 % solution Apply topically nightly. 1 Bottle 3    [DISCONTINUED] valACYclovir (VALTREX) 1 g tablet Take 1 tablet by mouth 3 times daily (Patient not taking: Reported on 1/20/2023) 21 tablet 0    [DISCONTINUED] triamcinolone (KENALOG) 0.1 % ointment Apply topically 2 times daily.  1 Tube 0    [DISCONTINUED] Elastic Bandages & Supports (ANKLE SPLINT/NIGHT AIRFORM) MISC 1 Device by Does not apply route every evening Plantar fasciitis of right foot  (primary encounter diagnosis)      Dispense posterior night splint. 1 each 0     No facility-administered encounter medications on file as of 3/6/2023.             KYLE Dimas - CNP

## 2023-03-08 LAB
MICROORGANISM SPEC CULT: ABNORMAL
SPECIMEN DESCRIPTION: ABNORMAL

## 2023-08-11 RX ORDER — POTASSIUM CHLORIDE 20 MEQ/1
TABLET, EXTENDED RELEASE ORAL
COMMUNITY
Start: 2023-03-23 | End: 2023-08-15

## 2023-08-11 RX ORDER — BENZONATATE 100 MG/1
CAPSULE ORAL
COMMUNITY
Start: 2023-01-09

## 2023-08-11 RX ORDER — BUDESONIDE AND FORMOTEROL FUMARATE DIHYDRATE 80; 4.5 UG/1; UG/1
AEROSOL RESPIRATORY (INHALATION)
COMMUNITY
Start: 2023-06-05

## 2023-08-11 RX ORDER — FUROSEMIDE 40 MG/1
40 TABLET ORAL DAILY
COMMUNITY
Start: 2023-03-23 | End: 2023-08-15

## 2023-08-15 ENCOUNTER — INITIAL CONSULT (OUTPATIENT)
Dept: SURGERY | Age: 78
End: 2023-08-15
Payer: MEDICARE

## 2023-08-15 VITALS
WEIGHT: 167 LBS | HEART RATE: 97 BPM | HEIGHT: 70 IN | BODY MASS INDEX: 23.91 KG/M2 | SYSTOLIC BLOOD PRESSURE: 134 MMHG | OXYGEN SATURATION: 90 % | DIASTOLIC BLOOD PRESSURE: 62 MMHG

## 2023-08-15 DIAGNOSIS — R19.09 BULGE IN GROIN AREA: Primary | ICD-10-CM

## 2023-08-15 PROCEDURE — 3075F SYST BP GE 130 - 139MM HG: CPT | Performed by: SURGERY

## 2023-08-15 PROCEDURE — 3078F DIAST BP <80 MM HG: CPT | Performed by: SURGERY

## 2023-08-15 PROCEDURE — 1036F TOBACCO NON-USER: CPT | Performed by: SURGERY

## 2023-08-15 PROCEDURE — G8420 CALC BMI NORM PARAMETERS: HCPCS | Performed by: SURGERY

## 2023-08-15 PROCEDURE — 1123F ACP DISCUSS/DSCN MKR DOCD: CPT | Performed by: SURGERY

## 2023-08-15 PROCEDURE — 99214 OFFICE O/P EST MOD 30 MIN: CPT | Performed by: SURGERY

## 2023-08-15 PROCEDURE — G8427 DOCREV CUR MEDS BY ELIG CLIN: HCPCS | Performed by: SURGERY

## 2023-08-15 PROCEDURE — 99204 OFFICE O/P NEW MOD 45 MIN: CPT | Performed by: SURGERY

## 2023-08-15 RX ORDER — TEMAZEPAM 15 MG/1
CAPSULE ORAL
COMMUNITY
Start: 2023-06-09

## 2023-08-15 RX ORDER — FLUTICASONE PROPIONATE 50 MCG
SPRAY, SUSPENSION (ML) NASAL
COMMUNITY
Start: 2023-06-28

## 2023-08-15 NOTE — PROGRESS NOTES
Abdifatah Kenny is a 77 y.o. male who presents today for further evaluation of possible left inguinal hernia. Patient reports that about a week ago he began to notice a small bulge in the left groin. Does not remember any specific inciting event and does not remember any specific pain symptoms in the groin prior to noticing the bulge. He does report that several days prior to noticing the bulge he had been carrying a box of food that he states \"was not heavy but was not light\". Since that time has begun to notice this bulge. Does seem that during the day it may get larger in size. Has no pain in the groin. Denies any changes in bowel movements or bladder function. No nausea or emesis. He does report that in the distant past approximately 15 years ago he had a hernia in the right groin that was repaired.     Past Medical History:   Diagnosis Date    Asthma     Hyperlipidemia     Hypertension        Past Surgical History:   Procedure Laterality Date    HERNIA REPAIR         Current Outpatient Medications   Medication Sig Dispense Refill    fluticasone (FLONASE) 50 MCG/ACT nasal spray       temazepam (RESTORIL) 15 MG capsule take 1 capsule by mouth once daily at bedtime for sleep      benzonatate (TESSALON) 100 MG capsule take 1 capsule by mouth three times a day if needed for cough      budesonide-formoterol (SYMBICORT) 80-4.5 MCG/ACT AERO Inhale into the lungs      mupirocin (BACTROBAN) 2 % ointment APPLY A SMALL AMOUNT INTO EACH NOSTRIL TWICE A DAY      ipratropium (ATROVENT) 0.03 % nasal spray       diphenhydrAMINE-APAP, sleep, (TYLENOL PM EXTRA STRENGTH)  MG tablet Diphenhydramine-Acetaminophen (Tylenol Pm Extra Strength)  mg Tablet Active 1 TAB PO bedtime January 19th, 2023 12:00am      VENTOLIN  (90 Base) MCG/ACT inhaler       atorvastatin (LIPITOR) 20 MG tablet Take 1 tablet by mouth daily      hydrochlorothiazide (HYDRODIURIL) 25 MG tablet       metFORMIN (GLUCOPHAGE) 500

## 2023-08-15 NOTE — ASSESSMENT & PLAN NOTE
At this point the patient does have a small bulge in the left groin but does not really behave like a hernia on exam.  May represent a small fat-containing hernia versus other. I will go ahead and get a ultrasound of the area just to ensure that this is actually a hernia before proceeding with any surgical intervention. We have discussed surgery for inguinal hernia. If hernias found he would like to proceed with surgery. We discussed robotic assisted laparoscopic left inguinal hernia repair possible bilateral with mesh. Risks of the procedure including bleeding, infection, scarring, pain, damage surrounding structures including spermatic cord structures, need for additional surgery, conversion open, prolonged pain, mesh complications and anesthesia risk are discussed and consent is obtained.

## 2023-08-17 ENCOUNTER — HOSPITAL ENCOUNTER (OUTPATIENT)
Dept: ULTRASOUND IMAGING | Age: 78
Discharge: HOME | End: 2023-08-19
Payer: MEDICARE

## 2023-08-17 DIAGNOSIS — R19.09 BULGE IN GROIN AREA: ICD-10-CM

## 2023-08-17 PROCEDURE — 76705 ECHO EXAM OF ABDOMEN: CPT

## 2023-08-18 ENCOUNTER — TELEPHONE (OUTPATIENT)
Dept: SURGERY | Age: 78
End: 2023-08-18

## 2023-08-18 DIAGNOSIS — Z01.818 PRE-OP TESTING: Primary | ICD-10-CM

## 2023-08-18 NOTE — TELEPHONE ENCOUNTER
[phone]  [phone]  Faxed HISTORY OF PRESENT ILLNESS:  67 yo female with h/o DM, HTN and long smoking history has been c/o LE pain and discoloration since the summer.  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  However, the left LE has been the most symptomatic; She has had worsening of the symptoms and ischemia over the last month in the LLE, and is now in severe pain in the area with significant discoloration. The RLE currently feels well.  She has no other systemic comlaints; no joint pains, CP, SOB, abd pain, bruising, etc.  She has no h/o blood clots or miscarraiges.  Workup for systemic clotting d/o has been negative and CT and MRI angios have not shown clotting.    PAST MEDICAL & SURGICAL HISTORY:  DM (diabetes mellitus)  HTN (hypertension)  Breast CA  No significant past surgical history        MEDICATIONS  (STANDING):  dextrose 5%. 1000 milliLiter(s) (50 mL/Hr) IV Continuous <Continuous>  dextrose 50% Injectable 12.5 Gram(s) IV Push once  gabapentin 200 milliGRAM(s) Oral three times a day  heparin  Infusion 1500 Unit(s)/Hr (18 mL/Hr) IV Continuous <Continuous>  insulin lispro (HumaLOG) corrective regimen sliding scale   SubCutaneous three times a day before meals  lidocaine   Patch 1 Patch Transdermal daily  lisinopril 10 milliGRAM(s) Oral daily  potassium chloride    Tablet ER 20 milliEquivalent(s) Oral every 2 hours  sodium chloride 0.9% with potassium chloride 20 mEq/L 1000 milliLiter(s) (75 mL/Hr) IV Continuous <Continuous>    MEDICATIONS  (PRN):  acetaminophen   Tablet .. 975 milliGRAM(s) Oral every 6 hours PRN Mild Pain (1 - 3)  acetaminophen   Tablet .. 650 milliGRAM(s) Oral every 6 hours PRN Temp greater or equal to 38C (100.4F)  dextrose 40% Gel 15 Gram(s) Oral once PRN Blood Glucose LESS THAN 70 milliGRAM(s)/deciliter  glucagon  Injectable 1 milliGRAM(s) IntraMuscular once PRN Glucose LESS THAN 70 milligrams/deciliter  oxyCODONE    IR 10 milliGRAM(s) Oral every 6 hours PRN moderate and severe pain      Allergies    No Known Allergies    Intolerances        PERTINENT MEDICATION HISTORY:    SOCIAL HISTORY:    FAMILY HISTORY:  No pertinent family history in first degree relatives      Vital Signs Last 24 Hrs  T(C): 37.2 (25 Nov 2018 05:43), Max: 37.3 (24 Nov 2018 21:39)  T(F): 99 (25 Nov 2018 05:43), Max: 99.2 (24 Nov 2018 21:39)  HR: 96 (25 Nov 2018 05:43) (95 - 98)  BP: 139/86 (25 Nov 2018 05:43) (133/79 - 151/85)  BP(mean): --  RR: 18 (25 Nov 2018 05:43) (18 - 18)  SpO2: 96% (25 Nov 2018 05:43) (96% - 98%)    Daily     Daily     PHYSICAL EXAM:      Constitutional:  well appearing    Eyes:  EOMI    ENMT:    Neck:  supple, No GUDELIA    Respiratory:    Cardiovascular:    Gastrointestinal:  abd soft nt nd      Extremities:  LLE; +darkening of the skin, occasional redness, very painful to touch, some gangrenous change of the toes.  DP pulse is present  RLE appears normal, no edema    Vascular:  as above; pulses present in feet      Skin:    Lymph Nodes:    Musculoskeletal:  No synovitis      Psychiatric:  appropriate, alert        LABS:                        8.5    13.60 )-----------( 530      ( 25 Nov 2018 08:20 )             27.1     11-25    133<L>  |  97<L>  |  6<L>  ----------------------------<  376<H>  3.1<L>   |  26  |  0.69    Ca    8.9      25 Nov 2018 05:35      PTT - ( 25 Nov 2018 05:35 )  PTT:98.2 SEC      RADIOLOGY & ADDITIONAL STUDIES:

## 2023-08-18 NOTE — TELEPHONE ENCOUNTER
[address]  FJJ:[birth year]  Surgical/Procedure Planned: Laparoscopic Robotic assisted left inguinal hernia repair with mesh    Date & Location: D       Outpatient     Planned Length of OR: 120 minutes    Sedation: general        Estimated Risk for Surgery/Procedure     Low______ Moderate______ High______          Provider:Dr. Radha Neri      Signature of Provider Giving Clearance    _____________________________________________

## 2023-08-18 NOTE — TELEPHONE ENCOUNTER
[address]  UZL:[birth year]  Surgical/Procedure Planned: Laparoscopic Robotic Assisted Left Inguinal Hernia repair with mesh    Date & Location: TBD       Outpatient     Planned Length of OR: 120 minutes    Sedation: general        Estimated Risk for Surgery/Procedure     Low______ Moderate______ High______      Provider:Dr. Margie Peck      Signature of Provider giving Clearance:    _____________________________________________

## 2023-08-22 ENCOUNTER — TELEPHONE (OUTPATIENT)
Dept: SURGERY | Age: 78
End: 2023-08-22

## 2023-08-22 NOTE — TELEPHONE ENCOUNTER
Patient calling, has questions regarding paperwork that was mailed to him. Please return phone call to discuss.

## 2023-08-24 ENCOUNTER — TELEPHONE (OUTPATIENT)
Dept: SURGERY | Age: 78
End: 2023-08-24

## 2023-08-25 ENCOUNTER — HOSPITAL ENCOUNTER (OUTPATIENT)
Age: 78
Discharge: HOME | End: 2023-08-25
Payer: MEDICARE

## 2023-08-25 ENCOUNTER — HOSPITAL ENCOUNTER (OUTPATIENT)
Dept: NON INVASIVE DIAGNOSTICS | Age: 78
Discharge: HOME | End: 2023-08-25
Payer: MEDICARE

## 2023-08-25 ENCOUNTER — HOSPITAL ENCOUNTER (OUTPATIENT)
Dept: GENERAL RADIOLOGY | Age: 78
End: 2023-08-25
Payer: MEDICARE

## 2023-08-25 DIAGNOSIS — Z01.818 PRE-OP TESTING: ICD-10-CM

## 2023-08-25 LAB
ALBUMIN SERPL-MCNC: 4.1 G/DL (ref 3.5–5.2)
ALBUMIN/GLOB SERPL: 1.1 {RATIO} (ref 1–2.5)
ALP SERPL-CCNC: 92 U/L (ref 40–129)
ALT SERPL-CCNC: 10 U/L (ref 5–41)
ANION GAP SERPL CALCULATED.3IONS-SCNC: 11 MMOL/L (ref 9–17)
AST SERPL-CCNC: 14 U/L
BASOPHILS # BLD: 0.04 K/UL (ref 0–0.2)
BASOPHILS NFR BLD: 0 % (ref 0–2)
BILIRUB SERPL-MCNC: 0.5 MG/DL (ref 0.3–1.2)
BUN SERPL-MCNC: 17 MG/DL (ref 8–23)
BUN/CREAT SERPL: 14 (ref 9–20)
CALCIUM SERPL-MCNC: 10.1 MG/DL (ref 8.6–10.4)
CHLORIDE SERPL-SCNC: 100 MMOL/L (ref 98–107)
CO2 SERPL-SCNC: 28 MMOL/L (ref 20–31)
CREAT SERPL-MCNC: 1.2 MG/DL (ref 0.7–1.2)
EKG ATRIAL RATE: 92 BPM
EKG P AXIS: 64 DEGREES
EKG P-R INTERVAL: 154 MS
EKG Q-T INTERVAL: 366 MS
EKG QRS DURATION: 98 MS
EKG QTC CALCULATION (BAZETT): 452 MS
EKG R AXIS: 52 DEGREES
EKG T AXIS: 58 DEGREES
EKG VENTRICULAR RATE: 92 BPM
EOSINOPHIL # BLD: 0.45 K/UL (ref 0–0.44)
EOSINOPHILS RELATIVE PERCENT: 4 % (ref 1–4)
ERYTHROCYTE [DISTWIDTH] IN BLOOD BY AUTOMATED COUNT: 14.1 % (ref 11.8–14.4)
GFR SERPL CREATININE-BSD FRML MDRD: >60 ML/MIN/1.73M2
GLUCOSE SERPL-MCNC: 115 MG/DL (ref 70–99)
HCT VFR BLD AUTO: 40.9 % (ref 40.7–50.3)
HGB BLD-MCNC: 13.3 G/DL (ref 13–17)
IMM GRANULOCYTES # BLD AUTO: 0.03 K/UL (ref 0–0.3)
IMM GRANULOCYTES NFR BLD: 0 %
LYMPHOCYTES NFR BLD: 1.75 K/UL (ref 1.1–3.7)
LYMPHOCYTES RELATIVE PERCENT: 17 % (ref 24–43)
MCH RBC QN AUTO: 28.5 PG (ref 25.2–33.5)
MCHC RBC AUTO-ENTMCNC: 32.5 G/DL (ref 25.2–33.5)
MCV RBC AUTO: 87.6 FL (ref 82.6–102.9)
MONOCYTES NFR BLD: 0.67 K/UL (ref 0.1–1.2)
MONOCYTES NFR BLD: 7 % (ref 3–12)
NEUTROPHILS NFR BLD: 72 % (ref 36–65)
NEUTS SEG NFR BLD: 7.43 K/UL (ref 1.5–8.1)
NRBC BLD-RTO: 0 PER 100 WBC
PLATELET # BLD AUTO: 344 K/UL (ref 138–453)
PMV BLD AUTO: 8.8 FL (ref 8.1–13.5)
POTASSIUM SERPL-SCNC: 3.7 MMOL/L (ref 3.7–5.3)
PROT SERPL-MCNC: 7.9 G/DL (ref 6.4–8.3)
RBC # BLD AUTO: 4.67 M/UL (ref 4.21–5.77)
SODIUM SERPL-SCNC: 139 MMOL/L (ref 135–144)
WBC OTHER # BLD: 10.4 K/UL (ref 3.5–11.3)

## 2023-08-25 PROCEDURE — 80053 COMPREHEN METABOLIC PANEL: CPT

## 2023-08-25 PROCEDURE — 85025 COMPLETE CBC W/AUTO DIFF WBC: CPT

## 2023-08-25 PROCEDURE — 93005 ELECTROCARDIOGRAM TRACING: CPT

## 2023-08-25 PROCEDURE — 36415 COLL VENOUS BLD VENIPUNCTURE: CPT

## 2023-08-25 PROCEDURE — 71046 X-RAY EXAM CHEST 2 VIEWS: CPT

## 2023-08-30 DIAGNOSIS — I45.10 INCOMPLETE RIGHT BUNDLE BRANCH BLOCK: Primary | ICD-10-CM

## 2023-09-11 ENCOUNTER — OFFICE VISIT (OUTPATIENT)
Dept: CARDIOLOGY | Age: 78
End: 2023-09-11
Payer: MEDICARE

## 2023-09-11 VITALS
BODY MASS INDEX: 23.91 KG/M2 | HEIGHT: 70 IN | HEART RATE: 84 BPM | WEIGHT: 167 LBS | SYSTOLIC BLOOD PRESSURE: 133 MMHG | DIASTOLIC BLOOD PRESSURE: 63 MMHG

## 2023-09-11 DIAGNOSIS — E78.2 MIXED HYPERLIPIDEMIA: Primary | ICD-10-CM

## 2023-09-11 DIAGNOSIS — R06.02 SHORTNESS OF BREATH: ICD-10-CM

## 2023-09-11 DIAGNOSIS — Z01.810 PREOPERATIVE CARDIOVASCULAR EXAMINATION: ICD-10-CM

## 2023-09-11 DIAGNOSIS — C34.90 PRIMARY LUNG CANCER, UNSPECIFIED LATERALITY (HCC): ICD-10-CM

## 2023-09-11 DIAGNOSIS — I10 PRIMARY HYPERTENSION: ICD-10-CM

## 2023-09-11 DIAGNOSIS — R94.31 ABNORMAL ECG: ICD-10-CM

## 2023-09-11 PROCEDURE — 3075F SYST BP GE 130 - 139MM HG: CPT | Performed by: INTERNAL MEDICINE

## 2023-09-11 PROCEDURE — G8427 DOCREV CUR MEDS BY ELIG CLIN: HCPCS | Performed by: INTERNAL MEDICINE

## 2023-09-11 PROCEDURE — 93005 ELECTROCARDIOGRAM TRACING: CPT | Performed by: INTERNAL MEDICINE

## 2023-09-11 PROCEDURE — 3078F DIAST BP <80 MM HG: CPT | Performed by: INTERNAL MEDICINE

## 2023-09-11 PROCEDURE — 99214 OFFICE O/P EST MOD 30 MIN: CPT | Performed by: INTERNAL MEDICINE

## 2023-09-11 PROCEDURE — 1036F TOBACCO NON-USER: CPT | Performed by: INTERNAL MEDICINE

## 2023-09-11 PROCEDURE — 93010 ELECTROCARDIOGRAM REPORT: CPT | Performed by: INTERNAL MEDICINE

## 2023-09-11 PROCEDURE — G8420 CALC BMI NORM PARAMETERS: HCPCS | Performed by: INTERNAL MEDICINE

## 2023-09-11 PROCEDURE — 1123F ACP DISCUSS/DSCN MKR DOCD: CPT | Performed by: INTERNAL MEDICINE

## 2023-09-11 PROCEDURE — 99204 OFFICE O/P NEW MOD 45 MIN: CPT | Performed by: INTERNAL MEDICINE

## 2023-09-11 RX ORDER — PSYLLIUM HUSK 3.4 G/7G
1 POWDER ORAL
COMMUNITY
Start: 2023-06-07

## 2023-09-11 NOTE — PROGRESS NOTES
occasional ectopic ventricular beat     WITHIN NORMAL LIMITS      Past Medical and Surgical History, Problem List, Allergies, Medications, Labs, Imaging, all reviewed extensively in EMR and with the patient. Assessment:  Preop risk  Abnormal ecg  HTN  DL  Asthma  R lower lobe lung cancer s/p robotic lower lobectomy with lymph node dissection / lysis of pl adhesions by Dr. Amanda Gonzalez:  Has good functional status  will check 2d Echo to eval for structural heart disease  If low risk, can proceed moderate risk  Can then follow up as needed. The patient is to continue heart healthy diet, weight loss and exercise as tolerated. Patient's medications and side effects were discussed. Medication refills were provided if needed. Follow up appointment timing was discussed. All questions and concerns were addressed to patient's satisfaction. Thank you for allowing me to participate in the care of this patient, please do not hesitate to call if you have any questions. Shady Baltazar DO, [facility], [address] [facility]  [facility]. [facility]  [phone]    This note was created with the assistance of a speech-recognition program.  Although the intention is to generate a document that actually reflects the content of the visit, no guarantees can be provided that every mistake has been identified and corrected by editing.

## 2023-09-13 ENCOUNTER — HOSPITAL ENCOUNTER (OUTPATIENT)
Age: 78
Discharge: HOME | End: 2023-09-15
Attending: INTERNAL MEDICINE
Payer: MEDICARE

## 2023-09-13 VITALS
HEART RATE: 86 BPM | BODY MASS INDEX: 23.91 KG/M2 | DIASTOLIC BLOOD PRESSURE: 67 MMHG | SYSTOLIC BLOOD PRESSURE: 153 MMHG | WEIGHT: 167 LBS | HEIGHT: 70 IN

## 2023-09-13 DIAGNOSIS — E78.2 MIXED HYPERLIPIDEMIA: ICD-10-CM

## 2023-09-13 DIAGNOSIS — R06.02 SHORTNESS OF BREATH: ICD-10-CM

## 2023-09-13 DIAGNOSIS — Z01.810 PREOPERATIVE CARDIOVASCULAR EXAMINATION: ICD-10-CM

## 2023-09-13 DIAGNOSIS — I10 PRIMARY HYPERTENSION: ICD-10-CM

## 2023-09-13 DIAGNOSIS — R94.31 ABNORMAL ECG: ICD-10-CM

## 2023-09-13 LAB
ECHO AV AREA PEAK VELOCITY: 1.7 CM2
ECHO AV AREA/BSA PEAK VELOCITY: 0.9 CM2/M2
ECHO AV PEAK GRADIENT: 13 MMHG
ECHO AV PEAK VELOCITY: 1.8 M/S
ECHO AV VELOCITY RATIO: 0.56
ECHO BSA: 1.93 M2
ECHO LA DIAMETER INDEX: 1.61 CM/M2
ECHO LA DIAMETER: 3.1 CM
ECHO LV E' LATERAL VELOCITY: 8 CM/S
ECHO LV E' SEPTAL VELOCITY: 9 CM/S
ECHO LV FRACTIONAL SHORTENING: 23 % (ref 28–44)
ECHO LV INTERNAL DIMENSION DIASTOLE INDEX: 2.49 CM/M2
ECHO LV INTERNAL DIMENSION DIASTOLIC: 4.8 CM (ref 4.2–5.9)
ECHO LV INTERNAL DIMENSION SYSTOLIC INDEX: 1.92 CM/M2
ECHO LV INTERNAL DIMENSION SYSTOLIC: 3.7 CM
ECHO LV ISOVOLUMETRIC RELAXATION TIME (IVRT): 106 MS
ECHO LV IVSD: 1.2 CM (ref 0.6–1)
ECHO LV MASS 2D: 181.9 G (ref 88–224)
ECHO LV MASS INDEX 2D: 94.3 G/M2 (ref 49–115)
ECHO LV POSTERIOR WALL DIASTOLIC: 0.9 CM (ref 0.6–1)
ECHO LV RELATIVE WALL THICKNESS RATIO: 0.38
ECHO LVOT AREA: 3.1 CM2
ECHO LVOT DIAM: 2 CM
ECHO LVOT PEAK GRADIENT: 4 MMHG
ECHO LVOT PEAK VELOCITY: 1 M/S
ECHO MV A VELOCITY: 1.15 M/S
ECHO MV E DECELERATION TIME (DT): 222 MS
ECHO MV E VELOCITY: 0.92 M/S
ECHO MV E/A RATIO: 0.8
ECHO MV E/E' LATERAL: 11.5
ECHO MV E/E' RATIO (AVERAGED): 10.86
ECHO MV E/E' SEPTAL: 10.22
ECHO MV MAX VELOCITY: 1.2 M/S
ECHO MV PEAK GRADIENT: 6 MMHG
ECHO TV PEAK GRADIENT: 5 MMHG

## 2023-09-13 PROCEDURE — 93306 TTE W/DOPPLER COMPLETE: CPT | Performed by: INTERNAL MEDICINE

## 2023-09-13 PROCEDURE — 93306 TTE W/DOPPLER COMPLETE: CPT

## 2023-09-15 ENCOUNTER — TELEPHONE (OUTPATIENT)
Dept: CARDIOLOGY | Age: 78
End: 2023-09-15

## 2023-09-15 NOTE — TELEPHONE ENCOUNTER
Please review echo and advise.      Last Appt:  9/11/2023  Next Appt:   Visit date not found  Med verified in [address] [place] [address] 15

## 2023-09-27 NOTE — TELEPHONE ENCOUNTER
Andrés vázquez Lela Aundrea
Cardio apt with Dr. Tracie Valentine 9/11/23 at 1PM. Patient is aware.  Patient will be scheduling pulmonology apt after cardia clearance
Due to recent EKG results, I'm assuming patient will need cardia clearance, please advise.
Faxed oncology and pulmonology clearance letters. Pre op testing ordered. Patient will get pre op testing done after clearances are received.
Per Dr. Bonnie Cervantes:    Patricio Blackwood, DO 3 days ago     SA  Low normal 50%, has good functional capacity     Can proceed at moderate risk
Pulmonology clearance scanned into chart.
(Tylenol Pm Extra Strength)  mg Tablet Active 1 TAB PO bedtime January 19th, 2023 12:00am      VENTOLIN  (90 Base) MCG/ACT inhaler       atorvastatin (LIPITOR) 20 MG tablet Take 1 tablet by mouth daily      hydrochlorothiazide (HYDRODIURIL) 25 MG tablet       metFORMIN (GLUCOPHAGE) 500 MG tablet Take 1 tablet by mouth daily (with breakfast)      alfuzosin (UROXATRAL) 10 MG extended release tablet Take 1 tablet by mouth daily      montelukast (SINGULAIR) 10 MG tablet Take 1 tablet by mouth nightly       No current facility-administered medications for this visit. Scheduled Meds:  Continuous Infusions:  PRN Meds:. Prior to Admission medications    Medication Sig Start Date End Date Taking?  Authorizing Provider   fluticasone (FLONASE) 50 MCG/ACT nasal spray  6/28/23   Historical Provider, MD   temazepam (RESTORIL) 15 MG capsule take 1 capsule by mouth once daily at bedtime for sleep 6/9/23   Historical Provider, MD   benzonatate (TESSALON) 100 MG capsule take 1 capsule by mouth three times a day if needed for cough 1/9/23   Historical Provider, MD   budesonide-formoterol (SYMBICORT) 80-4.5 MCG/ACT AERO Inhale into the lungs 6/5/23   Historical Provider, MD   mupirocin (BACTROBAN) 2 % ointment APPLY A SMALL AMOUNT INTO EACH NOSTRIL TWICE A DAY 2/23/23   Historical Provider, MD   ipratropium (ATROVENT) 0.03 % nasal spray  1/7/23   Historical Provider, MD   diphenhydrAMINE-APAP, sleep, (TYLENOL PM EXTRA STRENGTH)  MG tablet Diphenhydramine-Acetaminophen (Tylenol Pm Extra Strength)  mg Tablet Active 1 TAB PO bedtime January 19th, 2023 12:00am 1/19/23   Historical Provider, MD CALLOWAY  (90 Base) MCG/ACT inhaler  11/4/22   Historical Provider, MD   atorvastatin (LIPITOR) 20 MG tablet Take 1 tablet by mouth daily 8/8/17   Historical Provider, MD   hydrochlorothiazide (HYDRODIURIL) 25 MG tablet  7/20/17   Historical Provider, MD   metFORMIN (GLUCOPHAGE) 500 MG tablet Take 1 tablet by

## 2023-10-05 ENCOUNTER — TELEPHONE (OUTPATIENT)
Dept: SURGERY | Age: 78
End: 2023-10-05

## 2023-10-05 NOTE — DISCHARGE INSTRUCTIONS
Patient Discharge Instructions  Discharge Date:  10/06/23       Discharged To: Home    Home with Home Health Care: No    RESUME ACTIVITY:      BATHING: Ok to shower starting the day after surgery. No tub baths or submerging in water until after follow up in office. DRIVING: No driving for 1week  or while taking narcotic pain medications    RETURN TO WORK: Juice Dawson to return as tolerated 1 week following surgery with the following restrictions:  No lifting more than 10 pounds  The above restrictions are in effect for 6 week(s)    WALKING:  Yes    STAIRS:  Yes    LIFTING: Less than 10 pounds for 6weeks     DIET: common adult    SPECIAL INSTRUCTIONS:     Call the office at [phone] if you have a fever > 100 F, or if your incision becomes red, tender, or drains more than a small amount of clear fluid. Call for follow up appointment with Dr. German Zelaya in:  1-2weeks    May use ibuprofen, if able, for additional pain control. Use up to 400mg every 6 hours as needed. Ok to use ice packs to incisions for comfort. Use 15 minutes on, 30 minutes off and repeat as desired. SUPPLEMENTAL  INSTRUCTIONS FOLLOWING INGUINAL HERNIA REPAIR     For men to help prevent scrotal swelling and pain, it is recommended that you wear firm fitting \"jockey-type\" underwear or a scrotal support. Ice packs to the affected side are often helpful in reducing discomfort. A simple ice pack can be made by placing crushed ice in a sealable plastic bag and covering it with a hand towel. Heating pads should be avoided as they can make the swelling worse. It is not unusual for there to be quite a bit of bruising and swelling on the affected side, scrotal swelling and bruising as well. Use over the counter stool softeners such as miralax or colace as needed for constipation. no

## 2023-10-05 NOTE — H&P
atorvastatin (LIPITOR) 20 MG tablet Take 1 tablet by mouth daily        hydrochlorothiazide (HYDRODIURIL) 25 MG tablet          metFORMIN (GLUCOPHAGE) 500 MG tablet Take 1 tablet by mouth daily (with breakfast)        alfuzosin (UROXATRAL) 10 MG extended release tablet Take 1 tablet by mouth daily        montelukast (SINGULAIR) 10 MG tablet Take 1 tablet by mouth nightly          No current facility-administered medications for this visit. Allergies   Allergen Reactions    Aspirin-Acetaminophen         Patient states he had a reaction to the aspirin. Patient states he had a reaction to the aspirin.        Asa [Aspirin] Rash    Codeine         Other reaction(s): Gastrointestinal Upset, MI         Family History         Family History   Problem Relation Age of Onset    Cancer Mother      Asthma Father              Social History               Socioeconomic History    Marital status: Single       Spouse name: Not on file    Number of children: Not on file    Years of education: Not on file    Highest education level: Not on file   Occupational History    Not on file   Tobacco Use    Smoking status: Never    Smokeless tobacco: Never   Vaping Use    Vaping Use: Never used   Substance and Sexual Activity    Alcohol use: No    Drug use: No    Sexual activity: Not on file   Other Topics Concern    Not on file   Social History Narrative    Not on file      Social Determinants of Health      Financial Resource Strain: Not on file   Food Insecurity: Not on file   Transportation Needs: Not on file   Physical Activity: Not on file   Stress: Not on file   Social Connections: Not on file   Intimate Partner Violence: Not on file   Housing Stability: Not on file            ROS:   Review of Systems - General ROS: negative  Psychological ROS: negative  Ophthalmic ROS: negative  ENT ROS: negative  Respiratory ROS: no cough, shortness of breath, or wheezing  Cardiovascular ROS: no chest pain or dyspnea on exertion  Gastrointestinal ROS: per HPI  Genito-Urinary ROS: no dysuria, trouble voiding, or hematuria  Musculoskeletal ROS: negative  Dermatological ROS: negative        Objective       Vitals:     08/15/23 0925   BP: 134/62   Pulse: 97   SpO2: 90%      General:in no apparent distress and well developed and well nourished  Eyes: No gross abnormalities. Ears, Nose, Throat: hearing grossly normal bilaterally  Neck: neck supple and non tender without mass  Lungs: clear to auscultation without wheezes or rales   Heart: S1S2, no mumurs, RRR  Abdomen: Soft, nondistended, nontender, bowel sounds present. There does seem to be of small lump in the left groin above the level of the inguinal ligament. Does not really seem to change with Valsalva. Do not appreciate any specific defect and really cannot get any reduction of this lump during exam.  Extremity: negative  Neuro: CN II-XII grossly intact        1. Bulge in groin area  Assessment & Plan: At this point the patient does have a small bulge in the left groin but does not really behave like a hernia on exam.  May represent a small fat-containing hernia versus other. I will go ahead and get a ultrasound of the area just to ensure that this is actually a hernia before proceeding with any surgical intervention. We have discussed surgery for inguinal hernia. If hernias found he would like to proceed with surgery. We discussed robotic assisted laparoscopic left inguinal hernia repair possible bilateral with mesh. Risks of the procedure including bleeding, infection, scarring, pain, damage surrounding structures including spermatic cord structures, need for additional surgery, conversion open, prolonged pain, mesh complications and anesthesia risk are discussed and consent is obtained. Orders:  -     US ABDOMEN LIMITED;  Future           (Please note that portions of this note were completed with a voice recognition program.  Efforts were made to edit the dictations

## 2023-10-05 NOTE — TELEPHONE ENCOUNTER
Patient called stating he just got the prep paperwork in the mail today for his surgery tomorrow. Patient stated he is taking vitamins for his brain, iron, indigestion, skin/hair and one for a healthy immune system. He took them this AM.  Patient wondering if he can still have surgery tomorrow. Writer discussed with Dr. Felisa Shipley and okay was given to proceed tomorrow. Patient was notified. Advised patient to bring  his vitamins with him tomorrow so that Dr. Prince Iqbal view bottles if needed for active ingredients. Patient stated he would be here at 6 am tomorrow and he will be NPO.

## 2023-10-06 ENCOUNTER — HOSPITAL ENCOUNTER (OUTPATIENT)
Age: 78
Setting detail: OUTPATIENT SURGERY
Discharge: HOME | End: 2023-10-06
Attending: SURGERY | Admitting: SURGERY
Payer: MEDICARE

## 2023-10-06 ENCOUNTER — ANESTHESIA (OUTPATIENT)
Dept: OPERATING ROOM | Age: 78
End: 2023-10-06
Payer: MEDICARE

## 2023-10-06 ENCOUNTER — ANESTHESIA EVENT (OUTPATIENT)
Dept: OPERATING ROOM | Age: 78
End: 2023-10-06
Payer: MEDICARE

## 2023-10-06 VITALS
TEMPERATURE: 97 F | DIASTOLIC BLOOD PRESSURE: 65 MMHG | OXYGEN SATURATION: 97 % | BODY MASS INDEX: 23.62 KG/M2 | HEART RATE: 70 BPM | HEIGHT: 70 IN | RESPIRATION RATE: 16 BRPM | WEIGHT: 165 LBS | SYSTOLIC BLOOD PRESSURE: 134 MMHG

## 2023-10-06 DIAGNOSIS — G89.18 POST-OP PAIN: Primary | ICD-10-CM

## 2023-10-06 PROCEDURE — 6360000002 HC RX W HCPCS: Performed by: SURGERY

## 2023-10-06 PROCEDURE — S2900 ROBOTIC SURGICAL SYSTEM: HCPCS | Performed by: SURGERY

## 2023-10-06 PROCEDURE — 3600000019 HC SURGERY ROBOT ADDTL 15MIN: Performed by: SURGERY

## 2023-10-06 PROCEDURE — 2500000003 HC RX 250 WO HCPCS: Performed by: NURSE ANESTHETIST, CERTIFIED REGISTERED

## 2023-10-06 PROCEDURE — 7100000000 HC PACU RECOVERY - FIRST 15 MIN: Performed by: SURGERY

## 2023-10-06 PROCEDURE — C1781 MESH (IMPLANTABLE): HCPCS | Performed by: SURGERY

## 2023-10-06 PROCEDURE — 2580000003 HC RX 258: Performed by: SURGERY

## 2023-10-06 PROCEDURE — 7100000001 HC PACU RECOVERY - ADDTL 15 MIN: Performed by: SURGERY

## 2023-10-06 PROCEDURE — 6360000002 HC RX W HCPCS: Performed by: NURSE ANESTHETIST, CERTIFIED REGISTERED

## 2023-10-06 PROCEDURE — 2709999900 HC NON-CHARGEABLE SUPPLY: Performed by: SURGERY

## 2023-10-06 PROCEDURE — 7100000010 HC PHASE II RECOVERY - FIRST 15 MIN: Performed by: SURGERY

## 2023-10-06 PROCEDURE — 7100000011 HC PHASE II RECOVERY - ADDTL 15 MIN: Performed by: SURGERY

## 2023-10-06 PROCEDURE — 2500000003 HC RX 250 WO HCPCS: Performed by: SURGERY

## 2023-10-06 PROCEDURE — 3700000001 HC ADD 15 MINUTES (ANESTHESIA): Performed by: SURGERY

## 2023-10-06 PROCEDURE — 3600000009 HC SURGERY ROBOT BASE: Performed by: SURGERY

## 2023-10-06 PROCEDURE — 3700000000 HC ANESTHESIA ATTENDED CARE: Performed by: SURGERY

## 2023-10-06 DEVICE — MESH HERN W10XL15CM POLY POLYLACTIC ACID 70% CLLGN 30% GLYC: Type: IMPLANTABLE DEVICE | Status: FUNCTIONAL

## 2023-10-06 RX ORDER — HYDROCODONE BITARTRATE AND ACETAMINOPHEN 5; 325 MG/1; MG/1
1 TABLET ORAL EVERY 6 HOURS PRN
Qty: 28 TABLET | Refills: 0 | Status: SHIPPED | OUTPATIENT
Start: 2023-10-06 | End: 2023-10-13

## 2023-10-06 RX ORDER — ONDANSETRON 2 MG/ML
4 INJECTION INTRAMUSCULAR; INTRAVENOUS
Status: DISCONTINUED | OUTPATIENT
Start: 2023-10-06 | End: 2023-10-06 | Stop reason: HOSPADM

## 2023-10-06 RX ORDER — SODIUM CHLORIDE 0.9 % (FLUSH) 0.9 %
5-40 SYRINGE (ML) INJECTION PRN
Status: DISCONTINUED | OUTPATIENT
Start: 2023-10-06 | End: 2023-10-06 | Stop reason: HOSPADM

## 2023-10-06 RX ORDER — SODIUM CHLORIDE 0.9 % (FLUSH) 0.9 %
5-40 SYRINGE (ML) INJECTION EVERY 12 HOURS SCHEDULED
Status: DISCONTINUED | OUTPATIENT
Start: 2023-10-06 | End: 2023-10-06 | Stop reason: HOSPADM

## 2023-10-06 RX ORDER — SODIUM CHLORIDE, SODIUM LACTATE, POTASSIUM CHLORIDE, CALCIUM CHLORIDE 600; 310; 30; 20 MG/100ML; MG/100ML; MG/100ML; MG/100ML
INJECTION, SOLUTION INTRAVENOUS CONTINUOUS
Status: DISCONTINUED | OUTPATIENT
Start: 2023-10-06 | End: 2023-10-06 | Stop reason: HOSPADM

## 2023-10-06 RX ORDER — OXYCODONE HYDROCHLORIDE 5 MG/1
5 TABLET ORAL PRN
Status: DISCONTINUED | OUTPATIENT
Start: 2023-10-06 | End: 2023-10-06 | Stop reason: HOSPADM

## 2023-10-06 RX ORDER — DEXAMETHASONE SODIUM PHOSPHATE 4 MG/ML
INJECTION, SOLUTION INTRA-ARTICULAR; INTRALESIONAL; INTRAMUSCULAR; INTRAVENOUS; SOFT TISSUE PRN
Status: DISCONTINUED | OUTPATIENT
Start: 2023-10-06 | End: 2023-10-06 | Stop reason: SDUPTHER

## 2023-10-06 RX ORDER — PROPOFOL 10 MG/ML
INJECTION, EMULSION INTRAVENOUS PRN
Status: DISCONTINUED | OUTPATIENT
Start: 2023-10-06 | End: 2023-10-06 | Stop reason: SDUPTHER

## 2023-10-06 RX ORDER — OXYCODONE HYDROCHLORIDE 5 MG/1
10 TABLET ORAL PRN
Status: DISCONTINUED | OUTPATIENT
Start: 2023-10-06 | End: 2023-10-06 | Stop reason: HOSPADM

## 2023-10-06 RX ORDER — ROCURONIUM BROMIDE 10 MG/ML
INJECTION, SOLUTION INTRAVENOUS PRN
Status: DISCONTINUED | OUTPATIENT
Start: 2023-10-06 | End: 2023-10-06 | Stop reason: SDUPTHER

## 2023-10-06 RX ORDER — SODIUM CHLORIDE 9 MG/ML
INJECTION, SOLUTION INTRAVENOUS PRN
Status: DISCONTINUED | OUTPATIENT
Start: 2023-10-06 | End: 2023-10-06 | Stop reason: HOSPADM

## 2023-10-06 RX ORDER — FENTANYL CITRATE 50 UG/ML
INJECTION, SOLUTION INTRAMUSCULAR; INTRAVENOUS PRN
Status: DISCONTINUED | OUTPATIENT
Start: 2023-10-06 | End: 2023-10-06 | Stop reason: SDUPTHER

## 2023-10-06 RX ORDER — KETOROLAC TROMETHAMINE 30 MG/ML
INJECTION, SOLUTION INTRAMUSCULAR; INTRAVENOUS PRN
Status: DISCONTINUED | OUTPATIENT
Start: 2023-10-06 | End: 2023-10-06 | Stop reason: SDUPTHER

## 2023-10-06 RX ORDER — LIDOCAINE HYDROCHLORIDE 20 MG/ML
INJECTION, SOLUTION EPIDURAL; INFILTRATION; INTRACAUDAL; PERINEURAL PRN
Status: DISCONTINUED | OUTPATIENT
Start: 2023-10-06 | End: 2023-10-06 | Stop reason: SDUPTHER

## 2023-10-06 RX ADMIN — Medication 200 MG: at 07:27

## 2023-10-06 RX ADMIN — Medication 0.5 MG: at 08:43

## 2023-10-06 RX ADMIN — FENTANYL CITRATE 50 MCG: 50 INJECTION, SOLUTION INTRAMUSCULAR; INTRAVENOUS at 07:34

## 2023-10-06 RX ADMIN — PROPOFOL 150 MG: 10 INJECTION, EMULSION INTRAVENOUS at 07:27

## 2023-10-06 RX ADMIN — KETOROLAC TROMETHAMINE 15 MG: 30 INJECTION, SOLUTION INTRAMUSCULAR; INTRAVENOUS at 09:02

## 2023-10-06 RX ADMIN — LIDOCAINE HYDROCHLORIDE 2 ML: 20 INJECTION, SOLUTION EPIDURAL; INFILTRATION; INTRACAUDAL; PERINEURAL at 07:27

## 2023-10-06 RX ADMIN — DEXAMETHASONE SODIUM PHOSPHATE 4 MG: 4 INJECTION, SOLUTION INTRAMUSCULAR; INTRAVENOUS at 07:27

## 2023-10-06 RX ADMIN — ROCURONIUM BROMIDE 20 MG: 10 INJECTION, SOLUTION INTRAVENOUS at 08:40

## 2023-10-06 RX ADMIN — FENTANYL CITRATE 50 MCG: 50 INJECTION, SOLUTION INTRAMUSCULAR; INTRAVENOUS at 07:27

## 2023-10-06 RX ADMIN — Medication 0.5 MG: at 09:19

## 2023-10-06 RX ADMIN — SUGAMMADEX 200 MG: 100 INJECTION, SOLUTION INTRAVENOUS at 09:09

## 2023-10-06 RX ADMIN — ROCURONIUM BROMIDE 50 MG: 10 INJECTION, SOLUTION INTRAVENOUS at 07:27

## 2023-10-06 RX ADMIN — SODIUM CHLORIDE, POTASSIUM CHLORIDE, SODIUM LACTATE AND CALCIUM CHLORIDE: 600; 310; 30; 20 INJECTION, SOLUTION INTRAVENOUS at 07:23

## 2023-10-06 ASSESSMENT — PAIN SCALES - GENERAL
PAINLEVEL_OUTOF10: 0
PAINLEVEL_OUTOF10: 0

## 2023-10-06 ASSESSMENT — PAIN - FUNCTIONAL ASSESSMENT: PAIN_FUNCTIONAL_ASSESSMENT: 0-10

## 2023-10-06 NOTE — ANESTHESIA POSTPROCEDURE EVALUATION
Department of Anesthesiology  Postprocedure Note    Patient: Omar Alejandre  MRN: [id number]  [address] [facility] Skippers: 1945  Date of evaluation: 10/6/2023      Procedure Summary     Date: 10/06/23 Room / Location: [address] [place] [address]    Anesthesia Start: 9330 Anesthesia Stop: 5790    Procedure: Laparoscopic Robotic Asisted Left Inguinal Hernia repair with mesh (Left) Diagnosis:       Left inguinal hernia      (Left inguinal hernia [K40.90])    Surgeons: Zonia Xie DO Responsible Provider: KYLE Nicholson CRNA    Anesthesia Type: General ASA Status: 3          Anesthesia Type: General    Brooklynn Phase I: Brooklynn Score: 10    Brooklynn Phase II:        Anesthesia Post Evaluation    Patient location during evaluation: PACU  Patient participation: complete - patient participated  Level of consciousness: awake and alert  Pain score: 1  Airway patency: patent  Nausea & Vomiting: no nausea and no vomiting  Complications: no  Cardiovascular status: blood pressure returned to baseline and hemodynamically stable  Respiratory status: acceptable  Hydration status: euvolemic  Pain management: adequate

## 2023-10-06 NOTE — OP NOTE
[address] [facility] [address]                 [address] [place] [address],[facility] [id number]                                OPERATIVE REPORT    PATIENT NAME: Frederic Jiménez                       :        1945  MED REC NO:   [id number]                             ROOM:  ACCOUNT NO:   [de-identified]                           ADMIT DATE: 10/06/2023  PROVIDER:     Konrad Sin    DATE OF PROCEDURE:  10/06/2023    SURGEON:  Dr. Konrad Sin. ASSISTANT:  None. PREOPERATIVE DIAGNOSIS:  Left inguinal hernia. POSTOPERATIVE DIAGNOSIS:  Left direct inguinal hernia. PROCEDURE:  Robotic-assisted laparoscopic left inguinal hernia repair  with mesh. ANESTHESIA:  General.    ESTIMATED BLOOD LOSS:  15 mL. FLUIDS:  700 mL of crystalloid. COMPLICATIONS:  None. SPECIMENS:  None. INDICATION FOR PROCEDURE:  The patient is a 76-year-old gentleman who  presented to my office with left inguinal hernia. We discussed  management options and he desired to proceed with surgery. Prior to the  time of the procedure, risks, benefits, and alternatives were explained  to the patient and consent was obtained. DESCRIPTION OF PROCEDURE:  The patient was brought to the operative  suite and placed on the operative table in the supine position. Monitoring devices and SCD cuffs were placed. General endotracheal  anesthesia was induced. After induction of anesthesia, time-out was  performed and correct patient and procedure were verified. The  patient's abdomen was prepped and draped in the sterile fashion. Prior  to the time of incision, the patient received preoperative antibiotics  in accordance with SCIP protocol. The skin in the left upper quadrant  of the abdomen a few centimeters below costal margin at midclavicular  line was anesthetized with local anesthetic and a small transverse  incision was made through this area. Veress needle was advanced into  the abdomen.   Saline drop test sigmoid  colon was fairly prominent so it was actually pushing down lower aspect  of the dissection and making a little hard to get that lower lip of the  mesh down, but after several minutes of just kind of gently retracting  slowly working out mesh down, I was able to get it laid flat on the  tissue and made sure that the peritoneum was not tucked under that lower  lip of the mesh. With that done, the mesh seemed to be in good  placement. I went ahead and closed the peritoneal window with a 3-0  absorbable V-Loc in a running fashion. Once that was complete,  inspection showed that we did have good hemostasis. I expressed as much  of the CO2 out of the preperitoneal space as possible. The excess  material including the suture and needle material was removed from the  abdomen by bedside assist.  Final inspection of the abdomen showed no  obvious injuries to intra-abdominal structures. Working instruments  were removed and robot was undocked. At this point, I scrubbed back  into the case. Trocars were uncapped to allow evacuation of CO2 from  the abdomen. There was still a little fullness in the left inguinal  region and so I put some gentle pressure on that area to further express  the remaining CO2 out of preperitoneal space. All port sites were then  anesthetized with local anesthetic and closed at skin level with 4-0  Monocryl in subcuticular fashion. The patient's abdomen was then  cleansed and dried, and skin glue was placed across all incisions. At  the conclusion of the procedure, all sponge, instrument, and needle  counts were correct. The patient was awoken from anesthesia and taken  to PACU in stable condition.         Peri Paulino    D: 10/06/2023 9:19:46       T: 10/06/2023 9:27:35     [email or web address]  Job#: [id number]     Doc#: [id number]    CC:  Primary Care

## 2023-10-09 ENCOUNTER — TELEPHONE (OUTPATIENT)
Dept: SURGERY | Age: 78
End: 2023-10-09

## 2023-10-09 NOTE — TELEPHONE ENCOUNTER
Patient called stating he had surgery with Dr Live Grimm last Friday, 106/2023. (Robotic-assisted lap left inguinal hernia repair with mesh)    Patient states he does not want to take the pain medication that was prescribed to him as he feels it is addictive. HYDROcodone-acetaminophen (Cory Diomedes) 5-325 MG per tablet     Patient states he has taken tylenol since Friday, but was having pain Sunday and wants to know if there is anything else that can be prescribed.     Call [phone]    **Tell patient that his appointment has been changed to 2:40 due to surgery schedule**

## 2023-10-09 NOTE — TELEPHONE ENCOUNTER
Discussed with patient on alternating tylenol and ibuprofen as needed to stay ahead of the patient, and patient can adjust the dosing on there depending on how he feels. Patient has been relatively pain free since his surgery other than when he is a little more active. Patient is aware of the apt time change as well.

## 2023-10-18 PROBLEM — K40.90 LEFT INGUINAL HERNIA: Status: ACTIVE | Noted: 2023-10-18

## 2023-10-19 ENCOUNTER — OFFICE VISIT (OUTPATIENT)
Dept: SURGERY | Age: 78
End: 2023-10-19
Payer: MEDICARE

## 2023-10-19 VITALS
OXYGEN SATURATION: 97 % | DIASTOLIC BLOOD PRESSURE: 66 MMHG | TEMPERATURE: 97.9 F | BODY MASS INDEX: 24.36 KG/M2 | WEIGHT: 169.8 LBS | HEART RATE: 80 BPM | SYSTOLIC BLOOD PRESSURE: 106 MMHG

## 2023-10-19 DIAGNOSIS — Z09 POSTOP CHECK: Primary | ICD-10-CM

## 2023-10-19 PROCEDURE — 99024 POSTOP FOLLOW-UP VISIT: CPT | Performed by: SURGERY

## 2023-10-19 PROCEDURE — 99213 OFFICE O/P EST LOW 20 MIN: CPT | Performed by: SURGERY

## 2023-10-19 NOTE — ASSESSMENT & PLAN NOTE
Overall patient seems to be doing very well and healing as expected. I discussed the findings at the time of surgery with the patient he voiced understanding. Did have a direct hernia which was repaired. Has had prior right-sided inguinal hernia and it did appear that he has plug mesh on that side but there is no evidence of any recurrence of hernia. We did discuss that he could develop a hernia on that side sometime down the road. We will continue to monitor. Patient will continue activity restrictions for full 6 weeks after surgery then begin to return to activity as tolerated. Okay to drive. Okay to submerge in water. Discussed that if he has any questions concerns or problems he can call the office and we can get him back in for recheck but otherwise as long as he does well we will plan for follow-up as needed.

## 2023-10-19 NOTE — PROGRESS NOTES
Subjective   Dina Chandler is a 77 y.o. male who presents today for follow-up after recent robotic assisted left inguinal hernia repair with mesh for a direct inguinal hernia. Since the surgery the patient seems to be doing very well. He reports that he never took any of the narcotic pain medicine that was prescribed and only took a couple days of of over-the-counter Tylenol. Has not required any pain medicine for past several days. Has been tolerating diet. Having regular bowel and bladder function. Has not noticed any incisional problems. No bulges or pain in the groin. No other complaints.     Past Medical History:   Diagnosis Date    Asthma     Hyperlipidemia     Hypertension        Past Surgical History:   Procedure Laterality Date    HERNIA REPAIR      HERNIA REPAIR Left 10/6/2023    Laparoscopic Robotic Asisted Left Inguinal Hernia repair with mesh performed by Anni Villar DO at [facility] OR       Current Outpatient Medications   Medication Sig Dispense Refill    fluticasone (FLONASE) 50 MCG/ACT nasal spray       temazepam (RESTORIL) 15 MG capsule take 1 capsule by mouth once daily at bedtime for sleep (Patient not taking: Reported on 10/6/2023)      benzonatate (TESSALON) 100 MG capsule take 1 capsule by mouth three times a day if needed for cough (Patient not taking: Reported on 9/11/2023)      budesonide-formoterol (SYMBICORT) 80-4.5 MCG/ACT AERO Inhale into the lungs      mupirocin (BACTROBAN) 2 % ointment APPLY A SMALL AMOUNT INTO EACH NOSTRIL TWICE A DAY      ipratropium (ATROVENT) 0.03 % nasal spray       diphenhydrAMINE-APAP, sleep, (TYLENOL PM EXTRA STRENGTH)  MG tablet Diphenhydramine-Acetaminophen (Tylenol Pm Extra Strength)  mg Tablet Active 1 TAB PO bedtime January 19th, 2023 12:00am      VENTOLIN  (90 Base) MCG/ACT inhaler       atorvastatin (LIPITOR) 20 MG tablet Take 1 tablet by mouth daily      hydrochlorothiazide (HYDRODIURIL) 25 MG tablet Take 1 tablet by mouth

## 2023-11-18 PROBLEM — Z09 POSTOP CHECK: Status: RESOLVED | Noted: 2023-10-19 | Resolved: 2023-11-18

## 2024-01-17 ENCOUNTER — OFFICE VISIT (OUTPATIENT)
Dept: PODIATRY | Age: 79
End: 2024-01-17
Payer: MEDICARE

## 2024-01-17 VITALS
DIASTOLIC BLOOD PRESSURE: 74 MMHG | SYSTOLIC BLOOD PRESSURE: 138 MMHG | HEART RATE: 86 BPM | BODY MASS INDEX: 24.34 KG/M2 | WEIGHT: 170 LBS | HEIGHT: 70 IN

## 2024-01-17 DIAGNOSIS — L84 CORNS AND CALLOSITIES: ICD-10-CM

## 2024-01-17 DIAGNOSIS — M20.41 HAMMER TOES OF BOTH FEET: Primary | ICD-10-CM

## 2024-01-17 DIAGNOSIS — B35.1 DERMATOPHYTOSIS OF NAIL: ICD-10-CM

## 2024-01-17 DIAGNOSIS — E11.51 CONTROLLED TYPE 2 DM WITH PERIPHERAL CIRCULATORY DISORDER (HCC): ICD-10-CM

## 2024-01-17 DIAGNOSIS — M20.42 HAMMER TOES OF BOTH FEET: Primary | ICD-10-CM

## 2024-01-17 PROCEDURE — G8420 CALC BMI NORM PARAMETERS: HCPCS | Performed by: PODIATRIST

## 2024-01-17 PROCEDURE — 3075F SYST BP GE 130 - 139MM HG: CPT | Performed by: PODIATRIST

## 2024-01-17 PROCEDURE — 1036F TOBACCO NON-USER: CPT | Performed by: PODIATRIST

## 2024-01-17 PROCEDURE — G8427 DOCREV CUR MEDS BY ELIG CLIN: HCPCS | Performed by: PODIATRIST

## 2024-01-17 PROCEDURE — 99203 OFFICE O/P NEW LOW 30 MIN: CPT | Performed by: PODIATRIST

## 2024-01-17 PROCEDURE — 1123F ACP DISCUSS/DSCN MKR DOCD: CPT | Performed by: PODIATRIST

## 2024-01-17 PROCEDURE — 99214 OFFICE O/P EST MOD 30 MIN: CPT | Performed by: PODIATRIST

## 2024-01-17 PROCEDURE — 3078F DIAST BP <80 MM HG: CPT | Performed by: PODIATRIST

## 2024-01-17 PROCEDURE — G8484 FLU IMMUNIZE NO ADMIN: HCPCS | Performed by: PODIATRIST

## 2024-01-17 NOTE — PROGRESS NOTES
decision making.  Patient is acute complication with stable chronic condition.  2 new prescriptions.  Low risk morbidity  Pt refused callous care R 5th toe.  Urea cream 40% otc.  Use offloading pads also  Contact office with any questions/problems/concerns.  RTC in 1year(s).

## 2024-02-06 ENCOUNTER — OFFICE VISIT (OUTPATIENT)
Dept: PRIMARY CARE CLINIC | Age: 79
End: 2024-02-06
Payer: MEDICARE

## 2024-02-06 ENCOUNTER — HOSPITAL ENCOUNTER (OUTPATIENT)
Age: 79
Setting detail: SPECIMEN
Discharge: HOME | End: 2024-02-06
Payer: MEDICARE

## 2024-02-06 VITALS
DIASTOLIC BLOOD PRESSURE: 82 MMHG | OXYGEN SATURATION: 97 % | RESPIRATION RATE: 16 BRPM | HEIGHT: 70 IN | TEMPERATURE: 96.6 F | WEIGHT: 170 LBS | HEART RATE: 82 BPM | SYSTOLIC BLOOD PRESSURE: 122 MMHG | BODY MASS INDEX: 24.34 KG/M2

## 2024-02-06 DIAGNOSIS — L02.416 ABSCESS OF LEFT THIGH: Primary | ICD-10-CM

## 2024-02-06 DIAGNOSIS — L02.416 ABSCESS OF LEFT THIGH: ICD-10-CM

## 2024-02-06 PROBLEM — Z90.2 ACQUIRED ABSENCE OF LUNG (PART OF): Status: ACTIVE | Noted: 2023-03-31

## 2024-02-06 PROBLEM — C34.30 MALIGNANT NEOPLASM OF LOWER LOBE OF LUNG (HCC): Status: ACTIVE | Noted: 2023-03-31

## 2024-02-06 PROBLEM — M19.90 OSTEOARTHRITIS: Status: ACTIVE | Noted: 2024-02-06

## 2024-02-06 PROBLEM — Z87.891 PERSONAL HISTORY OF NICOTINE DEPENDENCE: Status: ACTIVE | Noted: 2023-03-15

## 2024-02-06 PROBLEM — G47.00 INSOMNIA: Status: ACTIVE | Noted: 2024-02-06

## 2024-02-06 PROBLEM — C34.30 LUNG CANCER, LOWER LOBE (HCC): Status: ACTIVE | Noted: 2023-03-08

## 2024-02-06 PROCEDURE — 10060 I&D ABSCESS SIMPLE/SINGLE: CPT | Performed by: FAMILY MEDICINE

## 2024-02-06 PROCEDURE — 87070 CULTURE OTHR SPECIMN AEROBIC: CPT

## 2024-02-06 PROCEDURE — 99211 OFF/OP EST MAY X REQ PHY/QHP: CPT | Performed by: FAMILY MEDICINE

## 2024-02-06 PROCEDURE — 87205 SMEAR GRAM STAIN: CPT

## 2024-02-06 RX ORDER — DOXYCYCLINE HYCLATE 100 MG
100 TABLET ORAL 2 TIMES DAILY
Qty: 20 TABLET | Refills: 0 | Status: SHIPPED | OUTPATIENT
Start: 2024-02-06 | End: 2024-02-16

## 2024-02-06 SDOH — ECONOMIC STABILITY: HOUSING INSECURITY
IN THE LAST 12 MONTHS, WAS THERE A TIME WHEN YOU DID NOT HAVE A STEADY PLACE TO SLEEP OR SLEPT IN A SHELTER (INCLUDING NOW)?: NO

## 2024-02-06 SDOH — ECONOMIC STABILITY: INCOME INSECURITY: HOW HARD IS IT FOR YOU TO PAY FOR THE VERY BASICS LIKE FOOD, HOUSING, MEDICAL CARE, AND HEATING?: NOT HARD AT ALL

## 2024-02-06 SDOH — ECONOMIC STABILITY: FOOD INSECURITY: WITHIN THE PAST 12 MONTHS, THE FOOD YOU BOUGHT JUST DIDN'T LAST AND YOU DIDN'T HAVE MONEY TO GET MORE.: NEVER TRUE

## 2024-02-06 SDOH — ECONOMIC STABILITY: FOOD INSECURITY: WITHIN THE PAST 12 MONTHS, YOU WORRIED THAT YOUR FOOD WOULD RUN OUT BEFORE YOU GOT MONEY TO BUY MORE.: NEVER TRUE

## 2024-02-06 ASSESSMENT — PATIENT HEALTH QUESTIONNAIRE - PHQ9
SUM OF ALL RESPONSES TO PHQ QUESTIONS 1-9: 0
2. FEELING DOWN, DEPRESSED OR HOPELESS: 0
SUM OF ALL RESPONSES TO PHQ9 QUESTIONS 1 & 2: 0
SUM OF ALL RESPONSES TO PHQ QUESTIONS 1-9: 0
1. LITTLE INTEREST OR PLEASURE IN DOING THINGS: 0

## 2024-02-06 NOTE — PROGRESS NOTES
[facility]             [address] [address]                        Telephone [phone]             Fax [phone]     Alberto Colon  :  1945  Age:  78 y.o.   MRN:  [id number]  Date of visit:  2024        Assessment & Plan:    Abscess of left thigh  An incision and drainage was done as noted below.  The patient tolerated this well.    A swab was sent for culture:  - Culture, Aerobic; Future    Doxycycline was prescribed:  - doxycycline hyclate (VIBRA-TABS) 100 MG tablet; Take 1 tablet by mouth 2 times daily for 10 days  Dispense: 20 tablet; Refill: 0    - 91924 - [place] DRAIN SKIN ABSCESS SIMPLE      Printed information regarding Skin Abscess was provided to the patient with his After visit Summary.   He will be contacted when the ID and sensitivity results are available.       Subjective:    Alberto Colon is a 78 y.o. male who presents to [facility] today (2024) for evaluation of:  Cyst (Pt with sore on left lateral thigh that has been red, swollen and painful about 4 days. )      He states that he has had a sore area on the left thigh for the past 4-5 days.   He states that it has gotten larger.   It is painful if he applies pressure.  He states that he had something similar in this area approximately a year ago that drained spontaneously.  He has not had any drainage with this episode.  He denies fever or chills.      Current Outpatient Medications   Medication Sig Dispense Refill    ciclopirox (PENLAC) 8 % solution Apply topically nightly. 1 each 3    fluticasone (FLONASE) 50 MCG/ACT nasal spray       budesonide-formoterol (SYMBICORT) 80-4.5 MCG/ACT AERO Inhale into the lungs      diphenhydrAMINE-APAP, sleep, (TYLENOL PM EXTRA STRENGTH)  MG tablet Diphenhydramine-Acetaminophen (Tylenol Pm Extra Strength)  mg Tablet Active 1 TAB PO bedtime 2023 12:00am      VENTOLIN  (90

## 2024-02-08 LAB
MICROORGANISM SPEC CULT: NORMAL
MICROORGANISM/AGENT SPEC: NORMAL
MICROORGANISM/AGENT SPEC: NORMAL
SPECIMEN DESCRIPTION: NORMAL

## 2024-11-02 ENCOUNTER — OFFICE VISIT (OUTPATIENT)
Dept: PRIMARY CARE CLINIC | Age: 79
End: 2024-11-02
Payer: MEDICARE

## 2024-11-02 VITALS
SYSTOLIC BLOOD PRESSURE: 132 MMHG | WEIGHT: 172.4 LBS | DIASTOLIC BLOOD PRESSURE: 68 MMHG | TEMPERATURE: 97.9 F | BODY MASS INDEX: 24.74 KG/M2 | HEART RATE: 96 BPM | OXYGEN SATURATION: 97 %

## 2024-11-02 DIAGNOSIS — J44.1 COPD EXACERBATION (HCC): Primary | ICD-10-CM

## 2024-11-02 PROCEDURE — 99214 OFFICE O/P EST MOD 30 MIN: CPT | Performed by: STUDENT IN AN ORGANIZED HEALTH CARE EDUCATION/TRAINING PROGRAM

## 2024-11-02 RX ORDER — HYDROCHLOROTHIAZIDE 12.5 MG/1
TABLET ORAL
COMMUNITY
Start: 2024-10-01

## 2024-11-02 RX ORDER — AZITHROMYCIN 250 MG/1
TABLET, FILM COATED ORAL
Qty: 6 TABLET | Refills: 0 | Status: SHIPPED | OUTPATIENT
Start: 2024-11-02 | End: 2024-11-12

## 2024-11-02 RX ORDER — OMEPRAZOLE 40 MG/1
CAPSULE, DELAYED RELEASE ORAL
COMMUNITY
Start: 2024-05-14

## 2024-11-02 RX ORDER — FLUTICASONE FUROATE AND VILANTEROL 100; 25 UG/1; UG/1
POWDER RESPIRATORY (INHALATION)
COMMUNITY
Start: 2024-05-31

## 2024-11-02 ASSESSMENT — ENCOUNTER SYMPTOMS
VOICE CHANGE: 1
COUGH: 1

## 2024-11-02 NOTE — PROGRESS NOTES
note reviewed.   Constitutional:       General: He is not in acute distress.     Appearance: He is well-developed. He is not diaphoretic.   HENT:      Head: Normocephalic and atraumatic.      Right Ear: External ear normal.      Left Ear: External ear normal.      Nose: Congestion present.   Eyes:      General: No scleral icterus.        Right eye: No discharge.         Left eye: No discharge.      Conjunctiva/sclera: Conjunctivae normal.   Cardiovascular:      Rate and Rhythm: Normal rate and regular rhythm.      Heart sounds: Normal heart sounds. No murmur heard.  Pulmonary:      Effort: Pulmonary effort is normal.      Breath sounds: Normal breath sounds. No wheezing or rhonchi.   Musculoskeletal:      Cervical back: Normal range of motion.   Skin:     General: Skin is warm and dry.      Findings: No erythema or rash.   Neurological:      Mental Status: He is alert and oriented to person, place, and time.      Cranial Nerves: No cranial nerve deficit.   Psychiatric:         Behavior: Behavior normal.         Thought Content: Thought content normal.         Judgment: Judgment normal.               (Please note that portions of this note were completed with a voice-recognition program. Efforts were made to edit the dictation but occasionally words are mis-transcribed.)

## 2025-01-31 ENCOUNTER — OFFICE VISIT (OUTPATIENT)
Dept: PRIMARY CARE CLINIC | Age: 80
End: 2025-01-31
Payer: MEDICARE

## 2025-01-31 VITALS
WEIGHT: 171 LBS | TEMPERATURE: 98 F | DIASTOLIC BLOOD PRESSURE: 62 MMHG | SYSTOLIC BLOOD PRESSURE: 126 MMHG | OXYGEN SATURATION: 100 % | BODY MASS INDEX: 24.54 KG/M2 | RESPIRATION RATE: 20 BRPM | HEART RATE: 120 BPM

## 2025-01-31 DIAGNOSIS — J44.1 COPD EXACERBATION (HCC): Primary | ICD-10-CM

## 2025-01-31 PROCEDURE — 1159F MED LIST DOCD IN RCRD: CPT

## 2025-01-31 PROCEDURE — G8420 CALC BMI NORM PARAMETERS: HCPCS

## 2025-01-31 PROCEDURE — 3074F SYST BP LT 130 MM HG: CPT

## 2025-01-31 PROCEDURE — 3023F SPIROM DOC REV: CPT

## 2025-01-31 PROCEDURE — 3078F DIAST BP <80 MM HG: CPT

## 2025-01-31 PROCEDURE — 1036F TOBACCO NON-USER: CPT

## 2025-01-31 PROCEDURE — G8427 DOCREV CUR MEDS BY ELIG CLIN: HCPCS

## 2025-01-31 PROCEDURE — 1160F RVW MEDS BY RX/DR IN RCRD: CPT

## 2025-01-31 PROCEDURE — 1123F ACP DISCUSS/DSCN MKR DOCD: CPT

## 2025-01-31 PROCEDURE — 99213 OFFICE O/P EST LOW 20 MIN: CPT

## 2025-01-31 PROCEDURE — 99212 OFFICE O/P EST SF 10 MIN: CPT

## 2025-01-31 RX ORDER — BENZONATATE 100 MG/1
100 CAPSULE ORAL 3 TIMES DAILY PRN
Qty: 30 CAPSULE | Refills: 0 | Status: SHIPPED | OUTPATIENT
Start: 2025-01-31 | End: 2025-02-10

## 2025-01-31 RX ORDER — AZITHROMYCIN 250 MG/1
TABLET, FILM COATED ORAL
Qty: 6 TABLET | Refills: 0 | Status: SHIPPED | OUTPATIENT
Start: 2025-01-31 | End: 2025-02-10

## 2025-01-31 ASSESSMENT — ENCOUNTER SYMPTOMS
COUGH: 1
SHORTNESS OF BREATH: 1
SORE THROAT: 0
SINUS COMPLAINT: 1
SINUS PRESSURE: 0
HOARSE VOICE: 0

## 2025-01-31 NOTE — PROGRESS NOTES
[facility]  [address] [facility] [address]  Phone: [phone]  Fax: [phone]      Alberto Colon  1945  MRN: [id number]  Date of visit: 1/31/2025    Chief Complaint:     Alberto Colon is here for c/o of Sinus Problem (A lot of sinus drainage started a week ago. )      HPI:     Alberto Colon is a 79 y.o. male who presents to the [facility] today for his medical conditions/complaints as noted below.    Sinus Problem  This is a new problem. Episode onset: 5 days. The problem is unchanged. There has been no fever. He is experiencing no pain. Associated symptoms include congestion, coughing and shortness of breath. Pertinent negatives include no chills, ear pain, headaches, hoarse voice, sinus pressure or sore throat. Treatments tried: tylenol pm, flonase. The treatment provided mild relief.       Past Medical History:   Diagnosis Date    Asthma     Hyperlipidemia     Hypertension         Allergies   Allergen Reactions    Asa [Aspirin] Rash    Aspirin-Acetaminophen Rash     Patient states he had a reaction to the aspirin.        Codeine      Other reaction(s): Gastrointestinal Upset, MI         Subjective:      Review of Systems   Constitutional:  Negative for chills.   HENT:  Positive for congestion. Negative for ear pain, hoarse voice, sinus pressure and sore throat.    Respiratory:  Positive for cough and shortness of breath.    Neurological:  Negative for headaches.       Objective:     Vitals:    01/31/25 1316   BP: 126/62   Pulse: (!) 120   Resp: 20   Temp: 98 °F (36.7 °C)   TempSrc: Tympanic   SpO2: 100%   Weight: 77.6 kg (171 lb)     Body mass index is 24.54 kg/m².    Physical Exam  Vitals and nursing note reviewed.   Constitutional:       Appearance: Normal appearance. He is not ill-appearing.      Comments: Productive cough   HENT:      Head: Normocephalic and atraumatic.      Right Ear: Tympanic membrane, ear canal and

## 2025-01-31 NOTE — PATIENT INSTRUCTIONS
Complete full course of antibiotics  Benzonatate as needed for cough  Continue with mucinex and nasal sprays  May use a dionisio pot or saline rinses as tolerated  May use tylenol for headaches  Increase water intake  If symptoms worsen follow up with PCP  Patient verbalized understanding and agrees with plan of care

## 2025-02-10 ENCOUNTER — OFFICE VISIT (OUTPATIENT)
Dept: PRIMARY CARE CLINIC | Age: 80
End: 2025-02-10
Payer: MEDICARE

## 2025-02-10 VITALS
WEIGHT: 169 LBS | TEMPERATURE: 98 F | DIASTOLIC BLOOD PRESSURE: 80 MMHG | BODY MASS INDEX: 24.25 KG/M2 | HEART RATE: 74 BPM | SYSTOLIC BLOOD PRESSURE: 120 MMHG

## 2025-02-10 DIAGNOSIS — J44.1 COPD EXACERBATION (HCC): Primary | ICD-10-CM

## 2025-02-10 PROCEDURE — 3023F SPIROM DOC REV: CPT

## 2025-02-10 PROCEDURE — G8420 CALC BMI NORM PARAMETERS: HCPCS

## 2025-02-10 PROCEDURE — 1036F TOBACCO NON-USER: CPT

## 2025-02-10 PROCEDURE — 99213 OFFICE O/P EST LOW 20 MIN: CPT

## 2025-02-10 PROCEDURE — 1159F MED LIST DOCD IN RCRD: CPT

## 2025-02-10 PROCEDURE — 3074F SYST BP LT 130 MM HG: CPT

## 2025-02-10 PROCEDURE — 1123F ACP DISCUSS/DSCN MKR DOCD: CPT

## 2025-02-10 PROCEDURE — 99212 OFFICE O/P EST SF 10 MIN: CPT

## 2025-02-10 PROCEDURE — G8427 DOCREV CUR MEDS BY ELIG CLIN: HCPCS

## 2025-02-10 PROCEDURE — 1160F RVW MEDS BY RX/DR IN RCRD: CPT

## 2025-02-10 PROCEDURE — 3079F DIAST BP 80-89 MM HG: CPT

## 2025-02-10 RX ORDER — DOXYCYCLINE HYCLATE 100 MG
100 TABLET ORAL 2 TIMES DAILY
Qty: 20 TABLET | Refills: 0 | Status: SHIPPED | OUTPATIENT
Start: 2025-02-10 | End: 2025-02-20

## 2025-02-10 RX ORDER — PREDNISONE 20 MG/1
40 TABLET ORAL DAILY
Qty: 10 TABLET | Refills: 0 | Status: SHIPPED | OUTPATIENT
Start: 2025-02-10 | End: 2025-02-15

## 2025-02-10 RX ORDER — GUAIFENESIN 600 MG/1
600 TABLET, EXTENDED RELEASE ORAL 2 TIMES DAILY
Qty: 30 TABLET | Refills: 0 | Status: SHIPPED | OUTPATIENT
Start: 2025-02-10 | End: 2025-02-25

## 2025-02-10 ASSESSMENT — PATIENT HEALTH QUESTIONNAIRE - PHQ9
SUM OF ALL RESPONSES TO PHQ QUESTIONS 1-9: 0
SUM OF ALL RESPONSES TO PHQ QUESTIONS 1-9: 0
1. LITTLE INTEREST OR PLEASURE IN DOING THINGS: NOT AT ALL
SUM OF ALL RESPONSES TO PHQ QUESTIONS 1-9: 0
SUM OF ALL RESPONSES TO PHQ QUESTIONS 1-9: 0
SUM OF ALL RESPONSES TO PHQ9 QUESTIONS 1 & 2: 0
2. FEELING DOWN, DEPRESSED OR HOPELESS: NOT AT ALL

## 2025-02-10 ASSESSMENT — ENCOUNTER SYMPTOMS
COUGH: 1
SORE THROAT: 0
SHORTNESS OF BREATH: 1
RHINORRHEA: 0
WHEEZING: 0

## 2025-02-10 NOTE — PATIENT INSTRUCTIONS
Steroid x 5 days  Discussed taking medication as prescribed in AM with food  Avoid NSAIDs while taking prescription steroid  Complete full course of antibiotics  Benzonatate as needed   Continue with mucinex and nasal sprays  May use a dionisio pot or saline rinses as tolerated  May use tylenol for headaches  Increase water intake  If symptoms worsen follow up with PCP  Patient verbalized understanding and agrees with plan of care

## 2025-02-10 NOTE — PROGRESS NOTES
Size: Large Adult   Pulse: 74   Temp: 98 °F (36.7 °C)   TempSrc: Tympanic   Weight: 76.7 kg (169 lb)     Body mass index is 24.25 kg/m².    Physical Exam  Vitals and nursing note reviewed.   Constitutional:       Appearance: Normal appearance. He is not ill-appearing.      Comments: Productive cough   HENT:      Head: Normocephalic and atraumatic.      Right Ear: Tympanic membrane, ear canal and external ear normal.      Left Ear: Tympanic membrane, ear canal and external ear normal.      Nose: Nose normal.      Right Sinus: No maxillary sinus tenderness or frontal sinus tenderness.      Left Sinus: No maxillary sinus tenderness or frontal sinus tenderness.      Mouth/Throat:      Mouth: Mucous membranes are moist.      Pharynx: No oropharyngeal exudate or posterior oropharyngeal erythema.   Eyes:      Conjunctiva/sclera: Conjunctivae normal.   Cardiovascular:      Rate and Rhythm: Normal rate and regular rhythm.      Heart sounds: Normal heart sounds. No murmur heard.     No friction rub. No gallop.   Pulmonary:      Effort: Pulmonary effort is normal.      Breath sounds: Normal breath sounds. No wheezing or rhonchi.   Musculoskeletal:      Cervical back: Neck supple. No tenderness.   Lymphadenopathy:      Cervical: No cervical adenopathy.   Skin:     General: Skin is warm.      Findings: No rash.   Neurological:      General: No focal deficit present.      Mental Status: He is alert.   Psychiatric:         Mood and Affect: Mood normal.         Behavior: Behavior normal.         Thought Content: Thought content normal.           Assessment and Plan        Diagnosis Orders   1. COPD exacerbation (HCC)  doxycycline hyclate (VIBRA-TABS) 100 MG tablet    predniSONE (DELTASONE) 20 MG tablet    guaiFENesin (MUCINEX) 600 MG extended release tablet        Orders Placed This Encounter    doxycycline hyclate (VIBRA-TABS) 100 MG tablet     Sig: Take 1 tablet by mouth 2 times daily for 10 days     Dispense:  20 tablet

## (undated) DEVICE — SUTURE MCRYL SZ 4-0 L18IN ABSRB UD L19MM PS-2 3/8 CIR PRIM Y496G

## (undated) DEVICE — BLANKET WRM W29.9XL79.1IN UP BODY FORC AIR MISTRAL-AIR

## (undated) DEVICE — INSUFFLATION NEEDLE TO ESTABLISH PNEUMOPERITONEUM.: Brand: INSUFFLATION NEEDLE

## (undated) DEVICE — GARMENT,MEDLINE,DVT,INT,CALF,MED, GEN2: Brand: MEDLINE

## (undated) DEVICE — PROCEDURE PACK TRENGUARD 450

## (undated) DEVICE — INSUFFLATION TUBING SET, ENDOFLATOR 50: Brand: N.A.

## (undated) DEVICE — 4-PORT MANIFOLD: Brand: NEPTUNE 2

## (undated) DEVICE — SUTURE V-LOC 90 3-0 L9IN ABSRB VLT L26MM V-20 1/2 CIR TAPR VLOCM0644

## (undated) DEVICE — PAD,ARMBOARD,CONV,FOAM,2X8X20",12PR/CS: Brand: MEDLINE

## (undated) DEVICE — SOLUTION ANTIFOG VIS SYS CLEARIFY LAPSCP

## (undated) DEVICE — TOTAL TRAY, DB, 100% SILI FOLEY, 16FR 10: Brand: MEDLINE

## (undated) DEVICE — BLADELESS OBTURATOR: Brand: WECK VISTA

## (undated) DEVICE — TIP COVER ACCESSORY

## (undated) DEVICE — ARM DRAPE

## (undated) DEVICE — GLOVE ORANGE PI 7   MSG9070

## (undated) DEVICE — GLOVE ORANGE PI 7 1/2   MSG9075

## (undated) DEVICE — SUTURE V-LOC 90 SZ 3-0 L6IN ABSRB VLT V-20 L26MM 1/2 CIR VLOCM0604

## (undated) DEVICE — SKIN AFFIX SURG ADHESIVE 72/CS 0.55ML: Brand: MEDLINE

## (undated) DEVICE — CANNULA SEAL

## (undated) DEVICE — GENERAL ROBOTIC PACK: Brand: MEDLINE INDUSTRIES, INC.

## (undated) DEVICE — SMOKE EVACUATION FILTER, SET WITH TUBE: Brand: N.A.

## (undated) DEVICE — TUBING SET, SUCTION LAP, S-PILOT: Brand: N.A.

## (undated) DEVICE — BLADE ES ELASTOMERIC COAT INSUL DURABLE BEND UPTO 90DEG